# Patient Record
Sex: FEMALE | Race: WHITE | Employment: FULL TIME | URBAN - METROPOLITAN AREA
[De-identification: names, ages, dates, MRNs, and addresses within clinical notes are randomized per-mention and may not be internally consistent; named-entity substitution may affect disease eponyms.]

---

## 2017-06-07 ENCOUNTER — HOSPITAL ENCOUNTER (OUTPATIENT)
Dept: MAMMOGRAPHY | Age: 57
Discharge: HOME | End: 2017-06-07
Attending: INTERNAL MEDICINE
Payer: COMMERCIAL

## 2017-06-07 DIAGNOSIS — Z12.31 VISIT FOR SCREENING MAMMOGRAM: ICD-10-CM

## 2017-06-07 PROCEDURE — 77067 SCR MAMMO BI INCL CAD: CPT

## 2017-06-19 ENCOUNTER — OFFICE VISIT (OUTPATIENT)
Dept: INTERNAL MEDICINE CLINIC | Age: 57
End: 2017-06-19

## 2017-06-19 VITALS
OXYGEN SATURATION: 99 % | HEIGHT: 66 IN | RESPIRATION RATE: 16 BRPM | DIASTOLIC BLOOD PRESSURE: 61 MMHG | WEIGHT: 159 LBS | HEART RATE: 72 BPM | SYSTOLIC BLOOD PRESSURE: 93 MMHG | TEMPERATURE: 97.9 F | BODY MASS INDEX: 25.55 KG/M2

## 2017-06-19 DIAGNOSIS — H57.11 EYE DISCOMFORT, RIGHT: ICD-10-CM

## 2017-06-19 DIAGNOSIS — H43.391 FLOATERS, RIGHT: Primary | ICD-10-CM

## 2017-06-19 NOTE — PROGRESS NOTES
Robinson Torrez is a 56 y.o. female who presents today for Eye Problem (Pt seeing floaters in eye x 1 week. Right eye, comes and goes)  . She has a history of   Patient Active Problem List   Diagnosis Code    DUB (dysfunctional uterine bleeding) N93.8   . Today patient is here for an acute visit. she does not have other concerns. Problem visit:  Robinson Torrez is here for complaint of tearing and floaters of R eye. .  Problem began 1 week(s) ago. Severity is mild  Character of problem: stable and slightly improving. No pus or discharge. Mild injection of conjunctiva. No pain, mild discomfort. No changes in vision. Does not wear contacts. Associated symptoms include: none. Treatments tried include: medication not used      ROS  Review of Systems   Constitutional: Negative for chills, fever and weight loss. HENT: Negative for congestion, ear pain, hearing loss, sore throat and tinnitus. Eyes: Positive for blurred vision and discharge. Negative for double vision and photophobia. Eye pain: Mild discomfort. Respiratory: Negative for cough, hemoptysis, sputum production and shortness of breath. Cardiovascular: Negative for chest pain, palpitations, orthopnea and leg swelling. Gastrointestinal: Negative for abdominal pain, constipation, diarrhea, heartburn, nausea and vomiting. Genitourinary: Negative for dysuria, frequency and urgency. Musculoskeletal: Negative for joint pain and myalgias. Skin: Negative for itching and rash. Neurological: Negative. Negative for headaches. Endo/Heme/Allergies: Does not bruise/bleed easily. Psychiatric/Behavioral: Negative for depression, memory loss, substance abuse and suicidal ideas.        Visit Vitals    BP 93/61 (BP 1 Location: Left arm, BP Patient Position: Sitting)    Pulse 72    Temp 97.9 °F (36.6 °C) (Oral)    Resp 16    Ht 5' 6\" (1.676 m)    Wt 159 lb (72.1 kg)    SpO2 99%    BMI 25.66 kg/m2       Physical Exam Constitutional: She appears well-developed and well-nourished. HENT:   Head: Normocephalic and atraumatic. Right Ear: External ear normal.   Left Ear: External ear normal.   Eyes: Conjunctivae and EOM are normal. Pupils are equal, round, and reactive to light. Right eye exhibits no discharge. No scleral icterus. Neck: Normal range of motion. Neck supple. Pulmonary/Chest: Effort normal. No respiratory distress. Neurological: She is alert. Skin: Skin is warm and dry. Psychiatric: She has a normal mood and affect. Her behavior is normal.         Current Outpatient Prescriptions   Medication Sig    ibuprofen (MOTRIN) 600 mg tablet Take 1 Tab by mouth every eight (8) hours as needed for Pain.  MULTIVITAMIN (VITAMIN A DAY PO) Take  by mouth.  omega-3 fatty acids-vitamin e (FISH OIL) 1,000 mg cap Take 2 Caps by mouth.  Cetirizine (ZYRTEC) 10 mg cap Take 1 Tab by mouth daily.  CYANOCOBALAMIN, VITAMIN B-12, (VITAMIN B-12 PO) Take  by mouth.  benzocaine-menthol (CEPACOL SORE THROAT, CHINEDU-MEN,) 15-2.6 mg lozg lozenge Take 1 Lozenge by mouth every two (2) hours as needed for Sore throat. No current facility-administered medications for this visit.          Past Medical History:   Diagnosis Date    Asthma     years ago had excersice induced asthma none currently      Past Surgical History:   Procedure Laterality Date    HX BREAST BIOPSY      needle bx done in the 80's brenign not sure which breast    HX GYN  2/13    ovarian cyst removed and uterine ablation      Social History   Substance Use Topics    Smoking status: Never Smoker    Smokeless tobacco: Never Used    Alcohol use Yes      Comment: ocassional      Family History   Problem Relation Age of Onset    Diabetes Mother      type ii    COPD Mother    [facility] Arthritis-osteo Mother     Hypertension Mother     Heart Disease Father     Stroke Father     Cancer Father      kidney ca     Bipolar Disorder Father         No Known Allergies     Assessment/Plan  George LUX was seen today for eye problem. Diagnoses and all orders for this visit:    Floaters, right - concern over potential retinal issue. Pt to be seen by optho. If unable to get in soon will let us know .    -     REFERRAL TO OPHTHALMOLOGY    Eye discomfort, right  -     REFERRAL TO OPHTHALMOLOGY        Follow-up Disposition: Not on File    Pallavi Granado MD  6/19/2017

## 2017-09-13 ENCOUNTER — OFFICE VISIT (OUTPATIENT)
Dept: INTERNAL MEDICINE CLINIC | Age: 57
End: 2017-09-13

## 2017-09-13 VITALS
HEART RATE: 75 BPM | TEMPERATURE: 99.1 F | HEIGHT: 66 IN | BODY MASS INDEX: 25.39 KG/M2 | WEIGHT: 158 LBS | OXYGEN SATURATION: 97 % | SYSTOLIC BLOOD PRESSURE: 96 MMHG | RESPIRATION RATE: 18 BRPM | DIASTOLIC BLOOD PRESSURE: 65 MMHG

## 2017-09-13 DIAGNOSIS — Z00.00 WELL WOMAN EXAM (NO GYNECOLOGICAL EXAM): Primary | ICD-10-CM

## 2017-09-13 DIAGNOSIS — M54.31 RIGHT SIDED SCIATICA: ICD-10-CM

## 2017-09-13 NOTE — MR AVS SNAPSHOT
Visit Information Date & Time Provider Department Dept. Phone Encounter #  
 9/13/2017  1:30 PM Imelda Donald MD [facility] [address] [id number] Upcoming Health Maintenance Date Due Hepatitis C Screening 1960 INFLUENZA AGE 11 TO ADULT 8/1/2017 BREAST CANCER SCRN MAMMOGRAM 6/7/2019 PAP AKA CERVICAL CYTOLOGY 4/1/2020 COLONOSCOPY 5/29/2025 DTaP/Tdap/Td series (2 - Td) 1/20/2026 Allergies as of 9/13/2017  Review Complete On: 9/13/2017 By: Imelda Donald MD  
 No Known Allergies Current Immunizations  Reviewed on 9/13/2017 Name Date Tdap 1/20/2016 Reviewed by Imelda Donald MD on 9/13/2017 at  1:54 PM  
You Were Diagnosed With   
  
 Codes Comments Well woman exam (no gynecological exam)    -  Primary ICD-10-CM: Z00.00 ICD-9-CM: V70.0 Right sided sciatica     ICD-10-CM: M54.31 
ICD-9-CM: 724.3 Vitals BP Pulse Temp Resp Height(growth percentile) Weight(growth percentile) 96/65 (BP 1 Location: Left arm, BP Patient Position: Sitting) 75 99.1 °F (37.3 °C) (Oral) 18 5' 6\" (1.676 m) 158 lb (71.7 kg) SpO2 BMI OB Status Smoking Status 97% 25.5 kg/m2 Menopause Never Smoker Vitals History BMI and BSA Data Body Mass Index Body Surface Area 25.5 kg/m 2 1.83 m 2 Preferred Pharmacy Pharmacy Name Phone [id number] Almendarez [facility] [phone] Your Updated Medication List  
  
   
This list is accurate as of: 9/13/17  2:09 PM.  Always use your most recent med list.  
  
  
  
  
 benzocaine-menthol 15-2.6 mg Lozg lozenge Commonly known as:  CEPACOL SORE THROAT (CHINEDU-MEN) Take 1 Lozenge by mouth every two (2) hours as needed for Sore throat. FISH OIL 1,000 mg Cap Generic drug:  omega-3 fatty acids-vitamin e Take 2 Caps by mouth. ibuprofen 600 mg tablet Commonly known as:  MOTRIN Take 1 Tab by mouth every eight (8) hours as needed for Pain. VITAMIN A DAY PO Take  by mouth. VITAMIN B-12 PO Take  by mouth. ZyrTEC 10 mg Cap Generic drug:  Cetirizine Take 1 Tab by mouth daily. We Performed the Following CBC WITH AUTOMATED DIFF [19841 CPT(R)] HEPATITIS C AB [28179 CPT(R)] LIPID PANEL [68599 CPT(R)] METABOLIC PANEL, COMPREHENSIVE [91039 CPT(R)] T4, FREE [id number] CPT(R)] TSH 3RD GENERATION [96368 CPT(R)] URINALYSIS W/ RFLX MICROSCOPIC [14034 CPT(R)] VITAMIN D, 25 HYDROXY [id number] CPT(R)] Introducing [facility]! Dear Tamera LUX: 
Thank you for requesting a [employer] account. Our records indicate that you already have an active [employer] account. You can access your account anytime at [email or web address] Did you know that you can access your hospital and ER discharge instructions at any time in [employer]? You can also review all of your test results from your hospital stay or ER visit. Additional Information If you have questions, please visit the Frequently Asked Questions section of the [employer] website at [email or web address] Remember, [employer] is NOT to be used for urgent needs. For medical emergencies, dial 911. Now available from your iPhone and Android! Please provide this summary of care documentation to your next provider. Your primary care clinician is listed as Roxanne Nair. If you have any questions after today's visit, please call [phone].

## 2017-09-13 NOTE — PROGRESS NOTES
Subjective:   56 y.o. female for Well Woman Check. Her gyne and breast care is done elsewhere by her Ob-Gyne physician. dr Mariam Barksdale  Did have eye exam for floaters and all normal  Had pap and vaginal us this spring normal  dexa normal  colonosocpy 5/15  [place] [facility] [place] and son in [facility] now and working  She enjoys cycle class biweekly and massage and has some right sided sciatica better with massage    Patient Active Problem List    Diagnosis Date Noted    DUB (dysfunctional uterine bleeding) 02/21/2013     Current Outpatient Prescriptions   Medication Sig Dispense Refill    ibuprofen (MOTRIN) 600 mg tablet Take 1 Tab by mouth every eight (8) hours as needed for Pain. 36 Tab 2    MULTIVITAMIN (VITAMIN A DAY PO) Take  by mouth.  omega-3 fatty acids-vitamin e (FISH OIL) 1,000 mg cap Take 2 Caps by mouth.  Cetirizine (ZYRTEC) 10 mg cap Take 1 Tab by mouth daily.  CYANOCOBALAMIN, VITAMIN B-12, (VITAMIN B-12 PO) Take  by mouth.  benzocaine-menthol (CEPACOL SORE THROAT, CHINEDU-MEN,) 15-2.6 mg lozg lozenge Take 1 Lozenge by mouth every two (2) hours as needed for Sore throat. 27 Lozenge 0     No Known Allergies  Past Medical History:   Diagnosis Date    Asthma     years ago had excersice induced asthma none currently     Past Surgical History:   Procedure Laterality Date    HX BREAST BIOPSY      needle bx done in the 80's brenign not sure which breast    HX GYN  2/13    ovarian cyst removed and uterine ablation     Family History   Problem Relation Age of Onset    Diabetes Mother      type ii    COPD Mother    [facility] Arthritis-osteo Mother     Hypertension Mother     Heart Disease Father     Stroke Father     Cancer Father      kidney ca     Bipolar Disorder Father      Social History   Substance Use Topics    Smoking status: Never Smoker    Smokeless tobacco: Never Used    Alcohol use Yes      Comment: ocassional             ROS: Feeling generally well.  No TIA's or unusual headaches, no dysphagia. No prolonged cough. No dyspnea or chest pain on exertion. No abdominal pain, change in bowel habits, black or bloody stools. No urinary tract symptoms. No new or unusual musculoskeletal symptoms. Specific concerns today: right buttock pain occas radiation to right leg not weak or numb. Objective: The patient appears well, alert, oriented x 3, in no distress. Visit Vitals    BP 96/65 (BP 1 Location: Left arm, BP Patient Position: Sitting)    Pulse 75    Temp 99.1 °F (37.3 °C) (Oral)    Resp 18    Ht 5' 6\" (1.676 m)    Wt 158 lb (71.7 kg)    SpO2 97%    BMI 25.5 kg/m2     ENT normal.  Neck supple. No adenopathy or thyromegaly. DENI. Lungs are clear, good air entry, no wheezes, rhonchi or rales. S1 and S2 normal, no murmurs, regular rate and rhythm. Abdomen soft without tenderness, guarding, mass or organomegaly. Extremities show no edema, normal peripheral pulses. Neurological is normal, no focal findings. Breast and Pelvic exams are deferred. dtr knees 2 plus and no weakness in legs    Assessment/Plan:   Well Woman  continue present plan  Diagnoses and all orders for this visit:    1. Well woman exam (no gynecological exam)  -     CBC WITH AUTOMATED DIFF  -     URINALYSIS W/ RFLX MICROSCOPIC  -     METABOLIC PANEL, COMPREHENSIVE  -     LIPID PANEL  -     TSH 3RD GENERATION  -     T4, FREE  -     VITAMIN D, 25 HYDROXY  -     HEPATITIS C AB    2.  Right sided sciatica    Cont stretches and massage

## 2017-09-16 LAB
25(OH)D3+25(OH)D2 SERPL-MCNC: 34.8 NG/ML (ref 30–100)
ALBUMIN SERPL-MCNC: 4 G/DL (ref 3.5–5.5)
ALBUMIN/GLOB SERPL: 1.5 {RATIO} (ref 1.2–2.2)
ALP SERPL-CCNC: 67 IU/L (ref 39–117)
ALT SERPL-CCNC: 10 IU/L (ref 0–32)
APPEARANCE UR: CLEAR
AST SERPL-CCNC: 16 IU/L (ref 0–40)
BASOPHILS # BLD AUTO: 0 X10E3/UL (ref 0–0.2)
BASOPHILS NFR BLD AUTO: 1 %
BILIRUB SERPL-MCNC: 0.8 MG/DL (ref 0–1.2)
BILIRUB UR QL STRIP: NEGATIVE
BUN SERPL-MCNC: 19 MG/DL (ref 6–24)
BUN/CREAT SERPL: 20 (ref 9–23)
CALCIUM SERPL-MCNC: 9.1 MG/DL (ref 8.7–10.2)
CHLORIDE SERPL-SCNC: 100 MMOL/L (ref 96–106)
CHOLEST SERPL-MCNC: 192 MG/DL (ref 100–199)
CO2 SERPL-SCNC: 25 MMOL/L (ref 18–29)
COLOR UR: YELLOW
CREAT SERPL-MCNC: 0.96 MG/DL (ref 0.57–1)
EOSINOPHIL # BLD AUTO: 0.2 X10E3/UL (ref 0–0.4)
EOSINOPHIL NFR BLD AUTO: 3 %
ERYTHROCYTE [DISTWIDTH] IN BLOOD BY AUTOMATED COUNT: 13 % (ref 12.3–15.4)
GLOBULIN SER CALC-MCNC: 2.6 G/DL (ref 1.5–4.5)
GLUCOSE SERPL-MCNC: 84 MG/DL (ref 65–99)
GLUCOSE UR QL: NEGATIVE
HCT VFR BLD AUTO: 41.6 % (ref 34–46.6)
HCV AB S/CO SERPL IA: <0.1 S/CO RATIO (ref 0–0.9)
HDLC SERPL-MCNC: 51 MG/DL
HGB BLD-MCNC: 14.2 G/DL (ref 11.1–15.9)
HGB UR QL STRIP: NEGATIVE
IMM GRANULOCYTES # BLD: 0 X10E3/UL (ref 0–0.1)
IMM GRANULOCYTES NFR BLD: 0 %
INTERPRETATION, 910389: NORMAL
KETONES UR QL STRIP: NEGATIVE
LDLC SERPL CALC-MCNC: 118 MG/DL (ref 0–99)
LEUKOCYTE ESTERASE UR QL STRIP: NEGATIVE
LYMPHOCYTES # BLD AUTO: 1.8 X10E3/UL (ref 0.7–3.1)
LYMPHOCYTES NFR BLD AUTO: 32 %
MCH RBC QN AUTO: 29.8 PG (ref 26.6–33)
MCHC RBC AUTO-ENTMCNC: 34.1 G/DL (ref 31.5–35.7)
MCV RBC AUTO: 87 FL (ref 79–97)
MICRO URNS: NORMAL
MONOCYTES # BLD AUTO: 0.3 X10E3/UL (ref 0.1–0.9)
MONOCYTES NFR BLD AUTO: 6 %
NEUTROPHILS # BLD AUTO: 3.2 X10E3/UL (ref 1.4–7)
NEUTROPHILS NFR BLD AUTO: 58 %
NITRITE UR QL STRIP: NEGATIVE
PH UR STRIP: 7 [PH] (ref 5–7.5)
PLATELET # BLD AUTO: 251 X10E3/UL (ref 150–379)
POTASSIUM SERPL-SCNC: 4.1 MMOL/L (ref 3.5–5.2)
PROT SERPL-MCNC: 6.6 G/DL (ref 6–8.5)
PROT UR QL STRIP: NEGATIVE
RBC # BLD AUTO: 4.77 X10E6/UL (ref 3.77–5.28)
SODIUM SERPL-SCNC: 140 MMOL/L (ref 134–144)
SP GR UR: 1.02 (ref 1–1.03)
T4 FREE SERPL-MCNC: 1.06 NG/DL (ref 0.82–1.77)
TRIGL SERPL-MCNC: 113 MG/DL (ref 0–149)
TSH SERPL DL<=0.005 MIU/L-ACNC: 5.06 UIU/ML (ref 0.45–4.5)
UROBILINOGEN UR STRIP-MCNC: 0.2 MG/DL (ref 0.2–1)
VLDLC SERPL CALC-MCNC: 23 MG/DL (ref 5–40)
WBC # BLD AUTO: 5.5 X10E3/UL (ref 3.4–10.8)

## 2017-10-11 ENCOUNTER — PATIENT MESSAGE (OUTPATIENT)
Dept: INTERNAL MEDICINE CLINIC | Age: 57
End: 2017-10-11

## 2017-10-11 DIAGNOSIS — E03.9 ACQUIRED HYPOTHYROIDISM: Primary | ICD-10-CM

## 2017-10-11 NOTE — TELEPHONE ENCOUNTER
From: Billie Schilder  To: Lizandro Singer MD  Sent: 10/11/2017 12:30 PM EDT  Subject: Visit Follow-Up Question    I just read the results of my lab work that reflected possible hypothyroidism, which would explain my fatigue. I would like to reschedule a follow-up for new lab work on the [facility] with an appointment with Dr. Jonn Burns. I will need to do the blood work first after 11/15/17. Can she place the orders so that I can go before my appointment t with her? Can I have an appointment on 11/20 after 11:00 am? Thank you.

## 2017-11-07 ENCOUNTER — PATIENT MESSAGE (OUTPATIENT)
Dept: INTERNAL MEDICINE CLINIC | Age: 57
End: 2017-11-07

## 2017-11-08 NOTE — TELEPHONE ENCOUNTER
From: Mónica Blank  To: Leelee Narayan MD  Sent: 11/7/2017 8:18 AM EST  Subject: Non-Urgent Medical Question    Thank you for sending the lab request form that I received a few weeks ago. When should I get my TSH rechecked, so that Davion James can have my results by the 20th? I realize that I needed to wait a certain amount of time to have the blood drawn again. Thank you for the clarification.

## 2017-11-14 LAB
FT4I SERPL CALC-MCNC: 1.6 (ref 1.2–4.9)
T3RU NFR SERPL: 28 % (ref 24–39)
T4 SERPL-MCNC: 5.8 UG/DL (ref 4.5–12)
TSH SERPL DL<=0.005 MIU/L-ACNC: 2.3 UIU/ML (ref 0.45–4.5)

## 2017-11-20 ENCOUNTER — OFFICE VISIT (OUTPATIENT)
Dept: INTERNAL MEDICINE CLINIC | Age: 57
End: 2017-11-20

## 2017-11-20 VITALS
DIASTOLIC BLOOD PRESSURE: 78 MMHG | HEART RATE: 71 BPM | HEIGHT: 66 IN | SYSTOLIC BLOOD PRESSURE: 110 MMHG | RESPIRATION RATE: 16 BRPM | WEIGHT: 159 LBS | TEMPERATURE: 98.5 F | BODY MASS INDEX: 25.55 KG/M2 | OXYGEN SATURATION: 98 %

## 2017-11-20 DIAGNOSIS — R79.89 ABNORMAL TSH: Primary | ICD-10-CM

## 2017-11-20 DIAGNOSIS — M54.31 RIGHT SIDED SCIATICA: ICD-10-CM

## 2017-11-20 NOTE — PROGRESS NOTES
HISTORY OF PRESENT ILLNESS  Rama Pichardo is a 56 y.o. female. HPI  Follow up. Issues:  1. Thyroid. She had a TSH of 5. We repeated it and it was down to 2.3 with normal panel. Interestingly she'd had some fatigue symptoms when the TSH was elevated and these have resolved. She currently has really no symptoms of fatigue, skin, hair or bowel changes. We're going to continue to monitor. 2. Sciatica, right leg, intermittent, better when she is taking cycling classes. Tends to worry her more at night when trying to go to bed. No weakness in foot. Review of Systems   Constitutional: Negative for chills, fever, malaise/fatigue and weight loss. Respiratory: Negative for cough, shortness of breath and wheezing. Cardiovascular: Negative for chest pain, palpitations, orthopnea, leg swelling and PND. Gastrointestinal: Negative for constipation, diarrhea, heartburn and nausea. Musculoskeletal: Positive for back pain. Negative for myalgias. Neurological: Negative for dizziness, focal weakness and headaches. Physical Exam   Constitutional: She is oriented to person, place, and time. She appears well-developed and well-nourished. HENT:   Head: Normocephalic and atraumatic. Neck: Normal range of motion. Neck supple. Carotid bruit is not present. No thyromegaly present. Cardiovascular: Normal rate, regular rhythm, S1 normal, S2 normal, normal heart sounds and intact distal pulses. No murmur heard. Pulmonary/Chest: Effort normal and breath sounds normal. No respiratory distress. She has no wheezes. She has no rales. Musculoskeletal: She exhibits no edema. patella reflex bilat intact and slr is negative   Neurological: She is alert and oriented to person, place, and time. Psychiatric: She has a normal mood and affect. Her behavior is normal.   Nursing note and vitals reviewed. ASSESSMENT and PLAN  Diagnoses and all orders for this visit:    1.  Abnormal TSH-now normal and no sxs  follow    2.  Right sided sciatica  Exercises reviewed and avoid twisting and lifting

## 2018-08-31 ENCOUNTER — HOSPITAL ENCOUNTER (OUTPATIENT)
Dept: MAMMOGRAPHY | Age: 58
Discharge: HOME | End: 2018-08-31
Attending: INTERNAL MEDICINE
Payer: COMMERCIAL

## 2018-08-31 DIAGNOSIS — Z12.31 VISIT FOR SCREENING MAMMOGRAM: ICD-10-CM

## 2018-08-31 PROCEDURE — 77063 BREAST TOMOSYNTHESIS BI: CPT

## 2019-03-01 ENCOUNTER — HOSPITAL ENCOUNTER (OUTPATIENT)
Dept: MAMMOGRAPHY | Age: 59
Discharge: HOME | End: 2019-03-01
Attending: OBSTETRICS & GYNECOLOGY
Payer: COMMERCIAL

## 2019-03-01 DIAGNOSIS — N63.20 LEFT BREAST MASS: ICD-10-CM

## 2019-03-01 DIAGNOSIS — R92.8 ABNORMAL MAMMOGRAM: ICD-10-CM

## 2019-03-01 PROCEDURE — 77065 DX MAMMO INCL CAD UNI: CPT

## 2019-03-01 PROCEDURE — 76642 ULTRASOUND BREAST LIMITED: CPT

## 2019-05-03 ENCOUNTER — OFFICE VISIT (OUTPATIENT)
Dept: INTERNAL MEDICINE CLINIC | Age: 59
End: 2019-05-03

## 2019-05-03 VITALS
OXYGEN SATURATION: 95 % | TEMPERATURE: 98.2 F | HEART RATE: 66 BPM | BODY MASS INDEX: 25.23 KG/M2 | SYSTOLIC BLOOD PRESSURE: 107 MMHG | WEIGHT: 157 LBS | RESPIRATION RATE: 16 BRPM | HEIGHT: 66 IN | DIASTOLIC BLOOD PRESSURE: 71 MMHG

## 2019-05-03 DIAGNOSIS — J30.1 SEASONAL ALLERGIC RHINITIS DUE TO POLLEN: Primary | ICD-10-CM

## 2019-05-03 DIAGNOSIS — J45.21 MILD INTERMITTENT REACTIVE AIRWAY DISEASE WITH ACUTE EXACERBATION: ICD-10-CM

## 2019-05-03 RX ORDER — MONTELUKAST SODIUM 10 MG/1
10 TABLET ORAL DAILY
Qty: 30 TAB | Refills: 2 | Status: SHIPPED | OUTPATIENT
Start: 2019-05-03 | End: 2019-10-10

## 2019-05-03 RX ORDER — ALBUTEROL SULFATE 90 UG/1
2 AEROSOL, METERED RESPIRATORY (INHALATION)
Qty: 1 INHALER | Refills: 2 | Status: SHIPPED | OUTPATIENT
Start: 2019-05-03 | End: 2021-04-28 | Stop reason: ALTCHOICE

## 2019-05-03 NOTE — PROGRESS NOTES
HISTORY OF PRESENT ILLNESS Tra Crowe is a 64 y.o. female. HPI Presents with complaints of significantly worse seasonal allergies this spring then she has had in the past; having nasal congestion, postnasal drainage, sneezing but has also developed some upper airway restriction and has been noticing some wheezing over the past couple of days. Has been taking OTC Zyrtec and Flonase nasal spray on a regular basis for environmental allergies but having more breakthrough symptoms with onset of pollen season. Has been tested for allergies in the past and she had a very significant allergy to mold but also grass. Noted some upper chest tightness and wheezing 2 days ago while going for an extended walk and this concerned her. Has history of exercise-induced asthma in the past and had an old inhaler at home which she used without relief. Denies fever, chills, purulent mucus. She is flying to [place] this afternoon to visit her daughter and will be there for a week. Her son takes Singulair with good results. Patient Active Problem List  
Diagnosis Code  DUB (dysfunctional uterine bleeding) N93.8 Past Surgical History:  
Procedure Laterality Date  HX GYN  2/13 ovarian cyst removed and uterine ablation  ANN BIOPSY BREAST STEREOTACTIC Left ? Long ago Benign Social History Socioeconomic History  Marital status:  Spouse name: Not on file  Number of children: Not on file  Years of education: Not on file  Highest education level: Not on file Occupational History  Not on file Social Needs  Financial resource strain: Not on file  Food insecurity:  
  Worry: Not on file Inability: Not on file  Transportation needs:  
  Medical: Not on file Non-medical: Not on file Tobacco Use  Smoking status: Never Smoker  Smokeless tobacco: Never Used Substance and Sexual Activity  Alcohol use: Yes Comment: ocassional  
 Drug use: Never  Sexual activity: Yes  
  Partners: Male Lifestyle  Physical activity:  
  Days per week: Not on file Minutes per session: Not on file  Stress: Not on file Relationships  Social connections:  
  Talks on phone: Not on file Gets together: Not on file Attends Restorationist service: Not on file Active member of club or organization: Not on file Attends meetings of clubs or organizations: Not on file Relationship status: Not on file  Intimate partner violence:  
  Fear of current or ex partner: Not on file Emotionally abused: Not on file Physically abused: Not on file Forced sexual activity: Not on file Other Topics Concern  Not on file Social History Narrative  Not on file Family History Problem Relation Age of Onset  Diabetes Mother   
     type ii  COPD Mother [employer] Arthritis-osteo Mother  Hypertension Mother  Heart Disease Father  Stroke Father  Cancer Father   
     kidney ca  Bipolar Disorder Father Current Outpatient Medications Medication Sig  MAGNESIUM PO Take 1 Tab by mouth nightly.  montelukast (SINGULAIR) 10 mg tablet Take 1 Tab by mouth daily.  albuterol (PROVENTIL HFA, VENTOLIN HFA, PROAIR HFA) 90 mcg/actuation inhaler Take 2 Puffs by inhalation every six (6) hours as needed for Wheezing.  ibuprofen (MOTRIN) 600 mg tablet Take 1 Tab by mouth every eight (8) hours as needed for Pain.  MULTIVITAMIN (VITAMIN A DAY PO) Take  by mouth.  omega-3 fatty acids-vitamin e (FISH OIL) 1,000 mg cap Take 2 Caps by mouth.  Cetirizine (ZYRTEC) 10 mg cap Take 1 Tab by mouth daily.  CYANOCOBALAMIN, VITAMIN B-12, (VITAMIN B-12 PO) Take  by mouth.  benzocaine-menthol (CEPACOL SORE THROAT, CHINEDU-MEN,) 15-2.6 mg lozg lozenge Take 1 Lozenge by mouth every two (2) hours as needed for Sore throat. No current facility-administered medications for this visit. No Known Allergies Immunization History Administered Date(s) Administered  Influenza Vaccine 09/13/2017  Tdap 01/20/2016 Review of Systems Constitutional: Positive for malaise/fatigue. Negative for chills and fever. HENT: Positive for congestion. Negative for sinus pain and sore throat. Respiratory: Positive for cough, shortness of breath and wheezing. Negative for sputum production. Cardiovascular: Negative for chest pain and palpitations. Gastrointestinal: Negative for nausea and vomiting. Musculoskeletal: Negative for joint pain and myalgias. Neurological: Positive for headaches. Negative for tingling. Endo/Heme/Allergies: Positive for environmental allergies. /71 (BP 1 Location: Left arm, BP Patient Position: Sitting)   Pulse 66   Temp 98.2 °F (36.8 °C) (Oral)   Resp 16   Ht 5' 6\" (1.676 m)   Wt 157 lb (71.2 kg)   LMP 02/28/2017   SpO2 95%   BMI 25.34 kg/m² Physical Exam  
Constitutional: She is oriented to person, place, and time. She appears well-developed and well-nourished. HENT:  
Head: Normocephalic and atraumatic. Right Ear: External ear normal.  
Left Ear: External ear normal.  
Nose: Mucosal edema and rhinorrhea present. Right sinus exhibits no maxillary sinus tenderness and no frontal sinus tenderness. Left sinus exhibits no maxillary sinus tenderness and no frontal sinus tenderness. Mouth/Throat: No posterior oropharyngeal erythema. Boggy nasal turbinates Neck: Normal range of motion. Neck supple. No thyromegaly present. Cardiovascular: Normal rate and regular rhythm. Pulmonary/Chest: Effort normal. She has wheezes (faint scattered wheezing). She has no rales. Musculoskeletal: Normal range of motion. Lymphadenopathy:  
  She has no cervical adenopathy. Neurological: She is alert and oriented to person, place, and time. Psychiatric: She has a normal mood and affect. Her behavior is normal.  
Nursing note and vitals reviewed. ASSESSMENT and PLAN Diagnoses and all orders for this visit: 1. Seasonal allergic rhinitis due to pollen --continue with OTC Zyrtec and Flonase nasal spray. -     montelukast (SINGULAIR) 10 mg tablet; Take 1 Tab by mouth daily. 2. Mild intermittent reactive airway disease with acute exacerbation 
-     montelukast (SINGULAIR) 10 mg tablet; Take 1 Tab by mouth daily. -     albuterol (PROVENTIL HFA, VENTOLIN HFA, PROAIR HFA) 90 mcg/actuation inhaler; Take 2 Puffs by inhalation every six (6) hours as needed for Wheezing. Encouraged to return to office for CPE with fasting labs. lab results and schedule of future lab studies reviewed with patient 
reviewed diet, exercise and weight control 
reviewed medications and side effects in detail This note will not be viewable in [address]

## 2019-05-03 NOTE — PATIENT INSTRUCTIONS
Seasonal Allergies: Care Instructions Your Care Instructions Allergies occur when your body's defense system (immune system) overreacts to certain substances. The immune system treats a harmless substance as if it were a harmful germ or virus. Many things can cause this to happen. Examples include pollens, medicine, food, dust, animal dander, and mold. Your allergies are seasonal if you have symptoms just at certain times of the year. In that case, you are probably allergic to pollens from certain trees, grasses, or weeds. Allergies can be mild or severe. Over-the-counter allergy medicine may help with some symptoms. Read and follow all instructions on the label. Managing your allergies is an important part of staying healthy. Your doctor may suggest that you have tests to help find the cause of your allergies. When you know what things trigger your symptoms, you can avoid them. This can prevent allergy symptoms and other health problems. In some cases, immunotherapy might help. For this treatment, you get shots or use pills that have a small amount of certain allergens in them. Your body \"gets used to\" the allergen, so you react less to it over time. This kind of treatment may help prevent or reduce some allergy symptoms. Follow-up care is a key part of your treatment and safety. Be sure to make and go to all appointments, and call your doctor if you are having problems. It's also a good idea to know your test results and keep a list of the medicines you take. How can you care for yourself at home? · Be safe with medicines. Take your medicines exactly as prescribed. Call your doctor if you think you are having a problem with your medicine. · During your allergy season, keep windows closed. If you need to use air-conditioning, change or clean all filters every month. Take a shower and change your clothes after you have been outside. · Stay inside when pollen counts are high. Vacuum once or twice a week. Use a vacuum  with a HEPA filter or a double-thickness filter. When should you call for help? Give an epinephrine shot if: 
  · You think you are having a severe allergic reaction.  
 After giving an epinephrine shot, call 911, even if you feel better. 
 Call 911 if: 
  · You have symptoms of a severe allergic reaction. These may include: 
? Sudden raised, red areas (hives) all over your body. ? Swelling of the throat, mouth, lips, or tongue. ? Trouble breathing. ? Passing out (losing consciousness). Or you may feel very lightheaded or suddenly feel weak, confused, or restless.  
  · You have been given an epinephrine shot, even if you feel better.  
 Call your doctor now or seek immediate medical care if: 
  · You have symptoms of an allergic reaction, such as: ? A rash or hives (raised, red areas on the skin). ? Itching. ? Swelling. ? Belly pain, nausea, or vomiting.  
 Watch closely for changes in your health, and be sure to contact your doctor if: 
  · You do not get better as expected. Where can you learn more? Go to http://brent-goran.info/. Enter J912 in the search box to learn more about \"Seasonal Allergies: Care Instructions. \" Current as of: June 27, 2018 Content Version: 11.9 © 9310-3641 [employer]. Care instructions adapted under license by [employer] (which disclaims liability or warranty for this information). If you have questions about a medical condition or this instruction, always ask your healthcare professional. [place] [address] any warranty or liability for your use of this information. Reactive Airway Disease: Care Instructions Your Care Instructions Reactive airway disease is a breathing problem that appears as wheezing, a whistling noise in your airways.  It may be caused by a viral or bacterial infection, allergies, tobacco smoke, or something else in the environment. When you are around these triggers, your body releases chemicals that make the airways get tight. Reactive airway disease is a lot like asthma. Both can cause wheezing. But asthma is ongoing, while reactive airway disease may occur only now and then. Tests can be done to tell whether you have asthma. You may take the same medicines used to treat asthma. Good home care and follow-up care with your doctor can help you recover. Follow-up care is a key part of your treatment and safety. Be sure to make and go to all appointments, and call your doctor if you are having problems. It's also a good idea to know your test results and keep a list of the medicines you take. How can you care for yourself at home? · Take your medicines exactly as prescribed. Call your doctor if you think you are having a problem with your medicine. · Do not smoke or allow others to smoke around you. If you need help quitting, talk to your doctor about stop-smoking programs and medicines. These can increase your chances of quitting for good. · If you know what caused your wheezing (such as perfume or the odor of household chemicals), try to avoid it in the future. · Wash your hands several times a day, and consider using hand gels or wipes that contain alcohol. This can prevent colds and other infections. When should you call for help? Call 911 anytime you think you may need emergency care. For example, call if: 
  · You have severe trouble breathing.  
 Watch closely for changes in your health, and be sure to contact your doctor if: 
  · You cough up yellow, dark brown, or bloody mucus.  
  · You have a fever.  
  · Your wheezing gets worse. Where can you learn more? Go to http://brent-goran.info/. Enter Z711 in the search box to learn more about \"Reactive Airway Disease: Care Instructions. \" Current as of: September 5, 2018 Content Version: 11.9 © 4599-4770 [employer]. Care instructions adapted under license by [employer] (which disclaims liability or warranty for this information). If you have questions about a medical condition or this instruction, always ask your healthcare professional. [address] any warranty or liability for your use of this information.

## 2019-05-03 NOTE — PROGRESS NOTES
Identified pt with two pt identifiers(name and ). Reviewed record in preparation for visit and have obtained necessary documentation. Chief Complaint Patient presents with  Breathing Problem Pt states been having difficulty breathing off and on throughout the day since 19 Visit Vitals /71 (BP 1 Location: Left arm, BP Patient Position: Sitting) Pulse 66 Temp 98.2 °F (36.8 °C) (Oral) Resp 16 Ht 5' 6\" (1.676 m) Wt 157 lb (71.2 kg) SpO2 95% BMI 25.34 kg/m² Health Maintenance Due Topic  Shingrix Vaccine Age 50> (1 of 2) Coordination of Care Questionnaire: 
:  
1) Have you been to an emergency room, urgent care, or hospitalized since your last visit? If yes, where when, and reason for visit? No 
  
 
 
2. Have seen or consulted any other health care provider since your last visit? If yes, where when, and reason for visit? Yes, Dr. Courtney Almaraz at [facility] Patient is accompanied by self I have received verbal consent from Stephenie Hartman to discuss any/all medical information while they are present in the room.

## 2019-05-08 NOTE — MR AVS SNAPSHOT
Patient was concerned that she is not taking medicine for Osteoporosis, Ml mentioned when they came out for a visit that she was not taking anything. The patient said Dr. Hal Guzman had prescribed medication but it made her sick but she is currently taking vitamins and calcium pills. Her number is [phone].       tio Visit Information Date & Time Provider Department Dept. Phone Encounter #  
 6/19/2017  8:15 AM Imelda Wahl MD [facility] [address] [facility] [id number] Your Appointments 9/13/2017  1:30 PM  
COMPLETE PHYSICAL with Seth Mccray MD  
[facility] [address]) Appt Note: annual physical with fasting blood work; pt r/s  
 [address] [facility] [address]  
[phone]  
  
   
 [address] [facility] [address] Upcoming Health Maintenance Date Due Hepatitis C Screening 1960 PAP AKA CERVICAL CYTOLOGY 4/14/1983 FOBT Q 1 YEAR AGE 50-75 4/14/2012 INFLUENZA AGE 9 TO ADULT 8/1/2017 BREAST CANCER SCRN MAMMOGRAM 6/7/2019 DTaP/Tdap/Td series (2 - Td) 1/20/2026 Allergies as of 6/19/2017  Review Complete On: 1/33/9041 By: Lydia Oliveros Wears No Known Allergies Current Immunizations  Never Reviewed Name Date Tdap 1/20/2016 Not reviewed this visit You Were Diagnosed With   
  
 Codes Comments Floaters, right    -  Primary ICD-10-CM: H43.391 ICD-9-CM: 379.24 Eye discomfort, right     ICD-10-CM: H57.11 ICD-9-CM: 379.91 Vitals BP Pulse Temp Resp Height(growth percentile) Weight(growth percentile) 93/61 (BP 1 Location: Left arm, BP Patient Position: Sitting) 72 97.9 °F (36.6 °C) (Oral) 16 5' 6\" (1.676 m) 159 lb (72.1 kg) LMP SpO2 BMI OB Status Smoking Status 02/28/2017 99% 25.66 kg/m2 Having regular periods Never Smoker Vitals History BMI and BSA Data Body Mass Index Body Surface Area  
 25.66 kg/m 2 1.83 m 2 Preferred Pharmacy Pharmacy Name Phone [facility] [address] Your Updated Medication List  
  
   
This list is accurate as of: 6/19/17  8:51 AM.  Always use your most recent med list.  
  
  
  
  
 benzocaine-menthol 15-2.6 mg Lozg lozenge Commonly known as:  CEPACOL SORE THROAT (CHINEDU-MEN) Take 1 Lozenge by mouth every two (2) hours as needed for Sore throat. FISH OIL 1,000 mg Cap Generic drug:  omega-3 fatty acids-vitamin e Take 2 Caps by mouth. ibuprofen 600 mg tablet Commonly known as:  MOTRIN Take 1 Tab by mouth every eight (8) hours as needed for Pain. VITAMIN A DAY PO Take  by mouth. VITAMIN B-12 PO Take  by mouth. ZyrTEC 10 mg Cap Generic drug:  Cetirizine Take 1 Tab by mouth daily. We Performed the Following REFERRAL TO OPHTHALMOLOGY [REF57 Custom] Comments:  
 Please evaluate patient for R eye floaters, r/o retinal derangement. Referral Information Referral ID Referred By Referred To  
  
 [id number] July Zavala Not Available Visits Status Start Date End Date 1 New Request 6/19/17 6/19/18 If your referral has a status of pending review or denied, additional information will be sent to support the outcome of this decision. Introducing [facility]! Dear Sanju LUX: 
Thank you for requesting a VasSol account. Our records indicate that you already have an active VasSol account. You can access your account anytime at [email or web address] Did you know that you can access your hospital and ER discharge instructions at any time in VasSol? You can also review all of your test results from your hospital stay or ER visit. Additional Information If you have questions, please visit the Frequently Asked Questions section of the VasSol website at [email or web address] Remember, VasSol is NOT to be used for urgent needs. For medical emergencies, dial 911. Now available from your iPhone and Android! Please provide this summary of care documentation to your next provider. Your primary care clinician is listed as Roxanne Nair.  If you have any questions after today's visit, please call [phone].

## 2019-08-29 ENCOUNTER — OFFICE VISIT (OUTPATIENT)
Dept: INTERNAL MEDICINE CLINIC | Age: 59
End: 2019-08-29

## 2019-08-29 VITALS
BODY MASS INDEX: 24.91 KG/M2 | HEIGHT: 66 IN | TEMPERATURE: 97.7 F | HEART RATE: 70 BPM | RESPIRATION RATE: 16 BRPM | OXYGEN SATURATION: 97 % | WEIGHT: 155 LBS | SYSTOLIC BLOOD PRESSURE: 102 MMHG | DIASTOLIC BLOOD PRESSURE: 72 MMHG

## 2019-08-29 DIAGNOSIS — R11.0 NAUSEA: Primary | ICD-10-CM

## 2019-08-29 DIAGNOSIS — R53.83 OTHER FATIGUE: ICD-10-CM

## 2019-08-29 DIAGNOSIS — R19.7 DIARRHEA, UNSPECIFIED TYPE: ICD-10-CM

## 2019-08-29 NOTE — PROGRESS NOTES
HISTORY OF PRESENT ILLNESS  Lorena Morelos is a 64 y.o. female. HPI  Pt reports that while playing tennis last week she started experiencing \"fever like sweats\", dizziness when she bent over, and nausea. She notes experiencing these sx are triggered by work outs. She notes improvement when drinking an electrolyte drink. She recalls feeling \"off\" for the last few weeks. She notes occasional gastrointestinal distress (diarrhea and stomach pains). Pt reports that she has had a slight intermittent metallic taste in her mouth and slightly decreased appetite. Pt reports that she ate an extremely \"mushy\" cantaloupe. Denies insomnia, bloating, increased stress, change in supplements, sinusitis, increased flatulence, recent travels, back, abdominal or shoulder pain. Pt continues with Fish oil and vitamin B supplements in the morning. Review of Systems   Constitutional: Positive for malaise/fatigue. Nausea   Gastrointestinal: Positive for diarrhea. Metallic taste in mouth   Neurological: Positive for dizziness. All other systems reviewed and are negative. Physical Exam   Constitutional: She is oriented to person, place, and time. She appears well-developed and well-nourished. HENT:   Head: Normocephalic and atraumatic. Right Ear: External ear normal.   Left Ear: External ear normal.   Nose: Nose normal.   Mouth/Throat: Oropharynx is clear and moist.   Eyes: Pupils are equal, round, and reactive to light. Conjunctivae and EOM are normal.   Neck: Normal range of motion. Neck supple. Cardiovascular: Normal rate, regular rhythm, normal heart sounds and intact distal pulses. Pulmonary/Chest: Effort normal and breath sounds normal. Right breast exhibits no inverted nipple, no mass, no nipple discharge, no skin change and no tenderness. Left breast exhibits no inverted nipple, no mass, no nipple discharge, no skin change and no tenderness. No breast swelling, tenderness, discharge or bleeding. Breasts are symmetrical.   Abdominal: Soft. Bowel sounds are normal. There is no CVA tenderness. No pain upon palpation    Genitourinary: Rectum normal and vagina normal. Rectal exam shows anal tone normal and guaiac negative stool. No breast swelling, tenderness, discharge or bleeding. Musculoskeletal: Normal range of motion. Neurological: She is alert and oriented to person, place, and time. Skin: Skin is warm and dry. Psychiatric: She has a normal mood and affect. Her behavior is normal. Judgment and thought content normal.   Nursing note and vitals reviewed. ASSESSMENT and PLAN  Diagnoses and all orders for this visit:    1. Nausea  Ordered labs to r/o thyroid issues, anemia, etc. Ordered abdominal US to r/o underlying issues or conditions. Discussed with pt that her sx re likely due to GI distress. Informed pt that her sx are not likely related to vertigo due to diarrhea and metallic taste in her mouth. Informed pt that if her sx persist and the labs are clear she will need to see a GI. Will continue to monitor for improvements or changes. -     CBC WITH AUTOMATED DIFF  -     METABOLIC PANEL, COMPREHENSIVE  -     AMYLASE  -     LIPASE  -     US ABD COMP; Future    2. Other fatigue  Discussed with pt that her sx re likely due to GI distress. Informed pt that her sx are not likely related to vertigo due to diarrhea and metallic taste in her mouth. Informed pt that if her sx persist and the labs are clear she will need to see a GI. Will continue to monitor for improvements or changes.  -     TSH 3RD GENERATION  -     US ABD COMP; Future    3. Diarrhea, unspecified type  Discussed with pt that her sx re likely due to GI distress. Informed pt that her sx are not likely related to vertigo due to diarrhea and metallic taste in her mouth. Informed pt that if her sx persist and the labs are clear she will need to see a GI.  Will continue to monitor for improvements or changes.  -     TSH 3RD GENERATION    Lab results and schedule of future lab studies reviewed with patient. Reviewed diet, exercise and weight control. Written by Davion Osuna, as dictated by Susie Angeles MD.     Current diagnosis and concerns discussed with pt at length. Understands risks and benefits or current treatment plan and medications and accepts the treatment and medication with any possible risks. Pt asks appropriate questions which were answered. Pt instructed to call with any concerns or problems. This note will not be viewable in [address]

## 2019-08-30 LAB
ALBUMIN SERPL-MCNC: 4.3 G/DL (ref 3.5–5.5)
ALBUMIN/GLOB SERPL: 1.7 {RATIO} (ref 1.2–2.2)
ALP SERPL-CCNC: 78 IU/L (ref 39–117)
ALT SERPL-CCNC: 13 IU/L (ref 0–32)
AMYLASE SERPL-CCNC: 89 U/L (ref 31–124)
AST SERPL-CCNC: 13 IU/L (ref 0–40)
BASOPHILS # BLD AUTO: 0 X10E3/UL (ref 0–0.2)
BASOPHILS NFR BLD AUTO: 1 %
BILIRUB SERPL-MCNC: 0.5 MG/DL (ref 0–1.2)
BUN SERPL-MCNC: 16 MG/DL (ref 6–24)
BUN/CREAT SERPL: 18 (ref 9–23)
CALCIUM SERPL-MCNC: 9.5 MG/DL (ref 8.7–10.2)
CHLORIDE SERPL-SCNC: 107 MMOL/L (ref 96–106)
CO2 SERPL-SCNC: 25 MMOL/L (ref 20–29)
CREAT SERPL-MCNC: 0.88 MG/DL (ref 0.57–1)
EOSINOPHIL # BLD AUTO: 0.1 X10E3/UL (ref 0–0.4)
EOSINOPHIL NFR BLD AUTO: 3 %
ERYTHROCYTE [DISTWIDTH] IN BLOOD BY AUTOMATED COUNT: 12.4 % (ref 12.3–15.4)
GLOBULIN SER CALC-MCNC: 2.5 G/DL (ref 1.5–4.5)
GLUCOSE SERPL-MCNC: 71 MG/DL (ref 65–99)
HCT VFR BLD AUTO: 41.5 % (ref 34–46.6)
HGB BLD-MCNC: 14.1 G/DL (ref 11.1–15.9)
IMM GRANULOCYTES # BLD AUTO: 0 X10E3/UL (ref 0–0.1)
IMM GRANULOCYTES NFR BLD AUTO: 0 %
LIPASE SERPL-CCNC: 23 U/L (ref 14–72)
LYMPHOCYTES # BLD AUTO: 1.4 X10E3/UL (ref 0.7–3.1)
LYMPHOCYTES NFR BLD AUTO: 33 %
MCH RBC QN AUTO: 29.6 PG (ref 26.6–33)
MCHC RBC AUTO-ENTMCNC: 34 G/DL (ref 31.5–35.7)
MCV RBC AUTO: 87 FL (ref 79–97)
MONOCYTES # BLD AUTO: 0.3 X10E3/UL (ref 0.1–0.9)
MONOCYTES NFR BLD AUTO: 8 %
NEUTROPHILS # BLD AUTO: 2.4 X10E3/UL (ref 1.4–7)
NEUTROPHILS NFR BLD AUTO: 55 %
PLATELET # BLD AUTO: 250 X10E3/UL (ref 150–450)
POTASSIUM SERPL-SCNC: 4.4 MMOL/L (ref 3.5–5.2)
PROT SERPL-MCNC: 6.8 G/DL (ref 6–8.5)
RBC # BLD AUTO: 4.76 X10E6/UL (ref 3.77–5.28)
SODIUM SERPL-SCNC: 146 MMOL/L (ref 134–144)
TSH SERPL DL<=0.005 MIU/L-ACNC: 3.19 UIU/ML (ref 0.45–4.5)
WBC # BLD AUTO: 4.3 X10E3/UL (ref 3.4–10.8)

## 2019-10-10 ENCOUNTER — OFFICE VISIT (OUTPATIENT)
Dept: INTERNAL MEDICINE CLINIC | Age: 59
End: 2019-10-10

## 2019-10-10 VITALS
SYSTOLIC BLOOD PRESSURE: 102 MMHG | HEART RATE: 61 BPM | DIASTOLIC BLOOD PRESSURE: 71 MMHG | TEMPERATURE: 97.6 F | WEIGHT: 152.2 LBS | HEIGHT: 66 IN | BODY MASS INDEX: 24.46 KG/M2 | RESPIRATION RATE: 15 BRPM | OXYGEN SATURATION: 99 %

## 2019-10-10 DIAGNOSIS — Z00.00 ROUTINE GENERAL MEDICAL EXAMINATION AT A HEALTH CARE FACILITY: Primary | ICD-10-CM

## 2019-10-10 NOTE — PROGRESS NOTES
HISTORY OF PRESENT ILLNESS  Viviana Winslow is a 64 y.o. female. HPI  Her previous symptoms seemed to be related to odorless fish oil as soon as she stopped it she felt better but still not sure if that was the case or related to virus      She does not have asthma - she did need an inhaler last spring as she     She tries to do something every day     Review of Systems   Constitutional: Negative for chills, diaphoresis, fever, malaise/fatigue and weight loss. HENT: Negative for congestion, ear pain, hearing loss, sore throat and tinnitus. Eyes: Negative for blurred vision and double vision. Respiratory: Negative for cough, hemoptysis, sputum production, shortness of breath and wheezing. Cardiovascular: Negative for chest pain, palpitations, orthopnea, claudication, leg swelling and PND. Gastrointestinal: Negative for abdominal pain, blood in stool, constipation, diarrhea, heartburn, nausea and vomiting. Genitourinary: Negative for dysuria, flank pain, frequency, hematuria and urgency. Musculoskeletal: Negative for back pain, joint pain, myalgias and neck pain. Skin: Negative. Neurological: Negative for dizziness, tingling, sensory change, speech change, focal weakness, seizures, loss of consciousness, weakness and headaches. Endo/Heme/Allergies: Negative for environmental allergies and polydipsia. Does not bruise/bleed easily. Psychiatric/Behavioral: Negative for depression, memory loss, substance abuse and suicidal ideas. The patient is not nervous/anxious and does not have insomnia. Physical Exam   Constitutional: She is oriented to person, place, and time. She appears well-developed and well-nourished. HENT:   Head: Normocephalic and atraumatic. Right Ear: External ear normal.   Left Ear: External ear normal.   Nose: Nose normal.   Mouth/Throat: Oropharynx is clear and moist.   Eyes: Pupils are equal, round, and reactive to light.  Conjunctivae and EOM are normal.   Neck: Normal range of motion. Neck supple. Cardiovascular: Normal rate, regular rhythm, normal heart sounds and intact distal pulses. Pulmonary/Chest: Effort normal and breath sounds normal. Right breast exhibits no inverted nipple, no mass, no nipple discharge, no skin change and no tenderness. Left breast exhibits no inverted nipple, no mass, no nipple discharge, no skin change and no tenderness. No breast swelling, tenderness, discharge or bleeding. Breasts are symmetrical.   Abdominal: Soft. Bowel sounds are normal.   Genitourinary: Rectum normal. Rectal exam shows anal tone normal. No breast swelling, tenderness, discharge or bleeding. Musculoskeletal: Normal range of motion. Neurological: She is alert and oriented to person, place, and time. Skin: Skin is warm and dry. Psychiatric: She has a normal mood and affect. Her behavior is normal. Judgment and thought content normal.   Nursing note and vitals reviewed. ASSESSMENT and PLAN  Diagnoses and all orders for this visit:    1. Routine general medical examination at a health care facility  -     LIPID PANEL    This note will not be viewable in [employer].         lab results and schedule of future lab studies reviewed with patient  reviewed diet, exercise and weight control  cardiovascular risk and specific lipid/LDL goals reviewed  reviewed medications and side effects in detail

## 2019-10-11 LAB
CHOLEST SERPL-MCNC: 202 MG/DL (ref 100–199)
HDLC SERPL-MCNC: 52 MG/DL
INTERPRETATION, 910389: NORMAL
LDLC SERPL CALC-MCNC: 133 MG/DL (ref 0–99)
TRIGL SERPL-MCNC: 84 MG/DL (ref 0–149)
VLDLC SERPL CALC-MCNC: 17 MG/DL (ref 5–40)

## 2020-02-07 ENCOUNTER — OFFICE VISIT (OUTPATIENT)
Dept: INTERNAL MEDICINE CLINIC | Age: 60
End: 2020-02-07

## 2020-02-07 VITALS
WEIGHT: 155 LBS | TEMPERATURE: 97.7 F | DIASTOLIC BLOOD PRESSURE: 69 MMHG | OXYGEN SATURATION: 97 % | BODY MASS INDEX: 24.91 KG/M2 | SYSTOLIC BLOOD PRESSURE: 108 MMHG | RESPIRATION RATE: 14 BRPM | HEART RATE: 63 BPM | HEIGHT: 66 IN

## 2020-02-07 DIAGNOSIS — J01.40 ACUTE PANSINUSITIS, RECURRENCE NOT SPECIFIED: ICD-10-CM

## 2020-02-07 DIAGNOSIS — R68.89 FLU-LIKE SYMPTOMS: Primary | ICD-10-CM

## 2020-02-07 LAB
FLUAV+FLUBV AG NOSE QL IA.RAPID: NEGATIVE POS/NEG
FLUAV+FLUBV AG NOSE QL IA.RAPID: NEGATIVE POS/NEG
VALID INTERNAL CONTROL?: YES

## 2020-02-07 RX ORDER — CEFDINIR 300 MG/1
300 CAPSULE ORAL 2 TIMES DAILY
Qty: 20 CAP | Refills: 0 | Status: SHIPPED | OUTPATIENT
Start: 2020-02-07 | End: 2020-11-11

## 2020-02-07 NOTE — PATIENT INSTRUCTIONS
Sinusitis: Care Instructions  Your Care Instructions    Sinusitis is an infection of the lining of the sinus cavities in your head. Sinusitis often follows a cold. It causes pain and pressure in your head and face. In most cases, sinusitis gets better on its own in 1 to 2 weeks. But some mild symptoms may last for several weeks. Sometimes antibiotics are needed. Follow-up care is a key part of your treatment and safety. Be sure to make and go to all appointments, and call your doctor if you are having problems. It's also a good idea to know your test results and keep a list of the medicines you take. How can you care for yourself at home? · Take an over-the-counter pain medicine, such as acetaminophen (Tylenol), ibuprofen (Advil, Motrin), or naproxen (Aleve). Read and follow all instructions on the label. · If the doctor prescribed antibiotics, take them as directed. Do not stop taking them just because you feel better. You need to take the full course of antibiotics. · Be careful when taking over-the-counter cold or flu medicines and Tylenol at the same time. Many of these medicines have acetaminophen, which is Tylenol. Read the labels to make sure that you are not taking more than the recommended dose. Too much acetaminophen (Tylenol) can be harmful. · Breathe warm, moist air from a steamy shower, a hot bath, or a sink filled with hot water. Avoid cold, dry air. Using a humidifier in your home may help. Follow the directions for cleaning the machine. · Use saline (saltwater) nasal washes to help keep your nasal passages open and wash out mucus and bacteria. You can buy saline nose drops at a grocery store or drugstore. Or you can make your own at home by adding 1 teaspoon of salt and 1 teaspoon of baking soda to 2 cups of distilled water. If you make your own, fill a bulb syringe with the solution, insert the tip into your nostril, and squeeze gently. Ed Bees your nose.   · Put a hot, wet towel or a warm gel pack on your face 3 or 4 times a day for 5 to 10 minutes each time. · Try a decongestant nasal spray like oxymetazoline (Afrin). Do not use it for more than 3 days in a row. Using it for more than 3 days can make your congestion worse. When should you call for help? Call your doctor now or seek immediate medical care if:    · You have new or worse swelling or redness in your face or around your eyes.     · You have a new or higher fever.    Watch closely for changes in your health, and be sure to contact your doctor if:    · You have new or worse facial pain.     · The mucus from your nose becomes thicker (like pus) or has new blood in it.     · You are not getting better as expected. Where can you learn more? Go to http://brent-goran.info/. Enter U334 in the search box to learn more about \"Sinusitis: Care Instructions. \"  Current as of: October 21, 2018  Content Version: 12.2  © 6877-1445 [employer]. Care instructions adapted under license by [employer] (which disclaims liability or warranty for this information). If you have questions about a medical condition or this instruction, always ask your healthcare professional. [place] [address] any warranty or liability for your use of this information.

## 2020-02-07 NOTE — PROGRESS NOTES
HISTORY OF PRESENT ILLNESS  Jossy Sánchez is a 64 y.o. female. HPI  Upper respiratory illness:  Jossy Sánchez presents with complaints of congestion, sore throat, post nasal drip, dry cough, myalgias, headache, hot and cold spells and thick yellow nasal discharge for 6 days. no nausea and no vomiting . she has not had  Shortness of breath or wheezing. Symptoms are moderate. Over-the-counter remedies including Advil, Dayquil, Nyquil   has been used with good relief of symptoms. Drinking plenty of fluids: yes  Asthma?:  no  non-smoker  Contacts with similar infections: yes         Review of Systems   Constitutional: Positive for chills, fever and malaise/fatigue. HENT: Positive for congestion, sinus pain and sore throat. Respiratory: Positive for cough. Negative for sputum production and shortness of breath. Cardiovascular: Negative for chest pain and palpitations. Gastrointestinal: Negative for nausea and vomiting. Musculoskeletal: Positive for myalgias. Skin: Negative for rash. Neurological: Positive for headaches. Negative for dizziness. /69 (BP 1 Location: Left arm, BP Patient Position: Sitting)   Pulse 63   Temp 97.7 °F (36.5 °C) (Oral)   Resp 14   Ht 5' 6\" (1.676 m)   Wt 155 lb (70.3 kg)   LMP 02/28/2017   SpO2 97%   BMI 25.02 kg/m²   Physical Exam  Vitals signs and nursing note reviewed. Constitutional:       Appearance: Normal appearance. She is ill-appearing. HENT:      Head: Normocephalic and atraumatic. Right Ear: Tympanic membrane, ear canal and external ear normal.      Left Ear: Tympanic membrane, ear canal and external ear normal.      Nose: Congestion present. Right Sinus: Maxillary sinus tenderness present. Left Sinus: Maxillary sinus tenderness present. Mouth/Throat:      Mouth: Mucous membranes are moist.      Pharynx: Posterior oropharyngeal erythema present. Neck:      Musculoskeletal: Normal range of motion and neck supple. Cardiovascular:      Rate and Rhythm: Normal rate and regular rhythm. Pulmonary:      Effort: Pulmonary effort is normal.      Breath sounds: Normal breath sounds. No wheezing or rhonchi. Lymphadenopathy:      Cervical: No cervical adenopathy. Skin:     General: Skin is warm and dry. Neurological:      General: No focal deficit present. Mental Status: She is alert and oriented to person, place, and time. ASSESSMENT and PLAN  Diagnoses and all orders for this visit:    1. Flu-like symptoms  -     AMB POC AZAEL INFLUENZA A/B TEST -- negative    2. Acute pansinusitis, recurrence not specified  -     cefdinir (OMNICEF) 300 mg capsule; Take 1 Cap by mouth two (2) times a day.       lab results and schedule of future lab studies reviewed with patient  reviewed diet, exercise and weight control  reviewed medications and side effects in detail

## 2020-11-11 ENCOUNTER — OFFICE VISIT (OUTPATIENT)
Dept: INTERNAL MEDICINE CLINIC | Age: 60
End: 2020-11-11
Payer: COMMERCIAL

## 2020-11-11 VITALS
SYSTOLIC BLOOD PRESSURE: 121 MMHG | HEART RATE: 79 BPM | RESPIRATION RATE: 12 BRPM | BODY MASS INDEX: 24.59 KG/M2 | DIASTOLIC BLOOD PRESSURE: 84 MMHG | WEIGHT: 153 LBS | TEMPERATURE: 98.2 F | HEIGHT: 66 IN | OXYGEN SATURATION: 98 %

## 2020-11-11 DIAGNOSIS — M65.4 DE QUERVAIN'S DISEASE (TENOSYNOVITIS): Primary | ICD-10-CM

## 2020-11-11 DIAGNOSIS — M67.40 GANGLION CYST: ICD-10-CM

## 2020-11-11 PROCEDURE — 99213 OFFICE O/P EST LOW 20 MIN: CPT | Performed by: INTERNAL MEDICINE

## 2020-11-11 NOTE — PROGRESS NOTES
Assessment and Plan   Diagnoses and all orders for this visit:    1. De Quervain's disease (tenosynovitis)  2. Ganglion cyst  Presents to clinic for 2-week history of left wrist pain. Reports she has had a lateral cysts present for several years that will come and go. Cyst has come back and has started having pain around her thumb and lateral forearm. 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 Finkelstein positive. Symptoms likely secondary to tenosynovitis versus ganglion cyst.  Recommend a few days of anti-inflammatory medications and ice. Would recommend a brace but I think that would irritate the ganglion cyst.  Recommend modifying how she holds her wrist when she is typing to decrease strain. If no improvement, consider orthopedic referral    Benefits, risks, possible drug interactions, and side effects of all new medications were reviewed with the patient. Pt verbalized understanding. Return to clinic: As scheduled    Donald Medina MD  [facility]  11/11/2020    Future Appointments   Date Time Provider Nicci Mel   3/24/2021  8:30 AM [facility]        Subjective   Chief Complaint   Left wrist pain    Dandre Mcneil is a 64 y.o. female         Review of Systems   Constitutional: Negative for chills and fever. Respiratory: Negative for shortness of breath. Cardiovascular: Negative for chest pain. Objective   Vitals:       Visit Vitals  /84 (BP 1 Location: Left arm, BP Patient Position: Sitting)   Pulse 79   Temp 98.2 °F (36.8 °C) (Oral)   Resp 12   Ht 5' 6\" (1.676 m)   Wt 153 lb (69.4 kg)   LMP 02/28/2017   SpO2 98%   BMI 24.69 kg/m²        Physical Exam  Constitutional:       Appearance: Normal appearance. She is not ill-appearing. Cardiovascular:      Rate and Rhythm: Normal rate. Pulmonary:      Effort: No respiratory distress.    Musculoskeletal:      Comments: Normal range of motion of the wrist.  Pea-sized hard subcutaneous nodule noted left lateral wrist.  Finkelstein sign positive. No significant swelling or erythema noted   Neurological:      Mental Status: She is alert. Gait: Gait normal.   Psychiatric:         Mood and Affect: Mood normal.         Thought Content: Thought content normal.         Judgment: Judgment normal.          Current Outpatient Medications   Medication Sig    TURMERIC PO Take  by mouth.  MAGNESIUM PO Take 1 Tab by mouth nightly.  albuterol (PROVENTIL HFA, VENTOLIN HFA, PROAIR HFA) 90 mcg/actuation inhaler Take 2 Puffs by inhalation every six (6) hours as needed for Wheezing.  ibuprofen (MOTRIN) 600 mg tablet Take 1 Tab by mouth every eight (8) hours as needed for Pain.  MULTIVITAMIN (VITAMIN A DAY PO) Take  by mouth.  omega-3 fatty acids-vitamin e (FISH OIL) 1,000 mg cap Take 2 Caps by mouth.  Cetirizine (ZYRTEC) 10 mg cap Take 1 Tab by mouth daily.  CYANOCOBALAMIN, VITAMIN B-12, (VITAMIN B-12 PO) Take  by mouth. No current facility-administered medications for this visit.

## 2020-11-11 NOTE — PROGRESS NOTES
Arcelia Zavala is a 64 y.o. female    Chief Complaint   Patient presents with    Wrist Pain     bump left side x couple of weeks        Health Maintenance Due   Topic Date Due    PAP AKA CERVICAL CYTOLOGY  04/01/2020    Flu Vaccine (1) 09/01/2020       Visit Vitals  /84 (BP 1 Location: Left arm, BP Patient Position: Sitting)   Pulse 79   Temp 98.2 °F (36.8 °C) (Oral)   Resp 12   Ht 5' 6\" (1.676 m)   Wt 153 lb (69.4 kg)   LMP 02/28/2017   SpO2 98%   BMI 24.69 kg/m²       3 most recent PHQ Screens 11/11/2020   Little interest or pleasure in doing things Not at all   Feeling down, depressed, irritable, or hopeless Not at all   Total Score PHQ 2 0       Fall Risk Assessment, last 12 mths 2/7/2020   Able to walk? Yes   Fall in past 12 months? Yes   Fall with injury? Yes   Number of falls in past 12 months 1   Fall Risk Score 2       Abuse Screening Questionnaire 11/11/2020   Do you ever feel afraid of your partner? N   Are you in a relationship with someone who physically or mentally threatens you? N   Is it safe for you to go home? Y         1. Have you been to the ER, urgent care clinic since your last visit? Hospitalized since your last visit?no    2. Have you seen or consulted any other health care providers outside of the [address] [place] [address] since your last visit? Include any pap smears or colon screening.  no

## 2021-01-19 ENCOUNTER — TELEPHONE (OUTPATIENT)
Dept: INTERNAL MEDICINE CLINIC | Age: 61
End: 2021-01-19

## 2021-01-19 NOTE — TELEPHONE ENCOUNTER
----- Message from Brennan Villa sent at 1/19/2021  3:57 PM EST -----  Regarding: Telephone  General Message/Vendor Calls    Caller's first and last name: Zaida Collin       Reason for call: PT just wanted to let the doctor know that she tested positive for Covid on January 13th       Callback required yes/no and why: No, she just wanted to inform the doctor       Best contact number(s): [phone]      Details to clarify the request:N/A       Brennan Villa

## 2021-02-22 ENCOUNTER — TRANSCRIBE ORDER (OUTPATIENT)
Dept: SCHEDULING | Age: 61
End: 2021-02-22

## 2021-02-22 DIAGNOSIS — Z12.31 SCREENING MAMMOGRAM FOR HIGH-RISK PATIENT: Primary | ICD-10-CM

## 2021-03-31 ENCOUNTER — HOSPITAL ENCOUNTER (OUTPATIENT)
Dept: MAMMOGRAPHY | Age: 61
Discharge: HOME | End: 2021-03-31
Attending: OBSTETRICS & GYNECOLOGY
Payer: COMMERCIAL

## 2021-03-31 DIAGNOSIS — Z12.31 SCREENING MAMMOGRAM FOR HIGH-RISK PATIENT: ICD-10-CM

## 2021-03-31 PROCEDURE — 77063 BREAST TOMOSYNTHESIS BI: CPT

## 2021-04-26 ENCOUNTER — TELEPHONE (OUTPATIENT)
Dept: INTERNAL MEDICINE CLINIC | Age: 61
End: 2021-04-26

## 2021-04-26 NOTE — TELEPHONE ENCOUNTER
----- Message from Tripp Lobo sent at 4/26/2021  9:42 AM EDT -----  Regarding: telephone  General Message/Vendor Calls    Caller's first and last name: PT       Reason for call: PT wants to know if you can send over a work order to the lab so she can have lab work done before her next appointment       Callback required yes/no and why: yes       Best contact number(s): [phone]      Details to clarify the request: n/a       Tripp Lobo

## 2021-04-28 ENCOUNTER — OFFICE VISIT (OUTPATIENT)
Dept: INTERNAL MEDICINE CLINIC | Age: 61
End: 2021-04-28
Payer: COMMERCIAL

## 2021-04-28 ENCOUNTER — HOSPITAL ENCOUNTER (OUTPATIENT)
Dept: LAB | Age: 61
Discharge: HOME | End: 2021-04-28
Payer: COMMERCIAL

## 2021-04-28 VITALS
HEART RATE: 68 BPM | BODY MASS INDEX: 24.59 KG/M2 | RESPIRATION RATE: 16 BRPM | HEIGHT: 66 IN | SYSTOLIC BLOOD PRESSURE: 106 MMHG | DIASTOLIC BLOOD PRESSURE: 73 MMHG | OXYGEN SATURATION: 96 % | TEMPERATURE: 98.5 F | WEIGHT: 153 LBS

## 2021-04-28 DIAGNOSIS — Z01.419 WELL WOMAN EXAM WITH ROUTINE GYNECOLOGICAL EXAM: Primary | ICD-10-CM

## 2021-04-28 DIAGNOSIS — Z98.890 HX OF COLONOSCOPY: ICD-10-CM

## 2021-04-28 PROBLEM — D24.9 FIBROADENOMA OF BREAST: Status: ACTIVE | Noted: 2019-03-03

## 2021-04-28 PROCEDURE — 99396 PREV VISIT EST AGE 40-64: CPT | Performed by: INTERNAL MEDICINE

## 2021-04-28 PROCEDURE — 87624 HPV HI-RISK TYP POOLED RSLT: CPT

## 2021-04-28 NOTE — PROGRESS NOTES
HISTORY OF PRESENT ILLNESS  Tod Baker is a 63 y.o. female. HPI  Seen for physical with pap smear. She did see Je Chong about six months ago and had a pelvic, but notes she did not have a pap smear at that time. Previous paps have been normal.  She had a colonoscopy in 2015, normal, ten year repeat advised. She has completed the COVID vaccine series and is looking forward to her daughter's wedding in [place] this summer. She has been treated for DeQuervain's tenosynovitis of wrist and believes that shortly after that was when she had symptomatic COVID. Fortunately did not need hospitalization and was never severely ill and has now completely recovered with no residual symptoms noted. She is working remotely as a therapist.      Review of Systems   Constitutional: Negative for chills, diaphoresis, fever, malaise/fatigue and weight loss. Respiratory: Negative for cough, shortness of breath and wheezing. Cardiovascular: Negative for chest pain, palpitations, orthopnea, leg swelling and PND. Gastrointestinal: Negative for abdominal pain, heartburn, nausea and vomiting. Genitourinary: Negative for dysuria. Musculoskeletal: Negative for myalgias. Neurological: Negative for dizziness and headaches. Psychiatric/Behavioral: Negative for depression and memory loss. The patient does not have insomnia. All other systems reviewed and are negative. Physical Exam  Vitals signs and nursing note reviewed. Constitutional:       Appearance: She is well-developed. HENT:      Head: Normocephalic and atraumatic. Right Ear: Tympanic membrane, ear canal and external ear normal.      Left Ear: Tympanic membrane, ear canal and external ear normal.      Nose: Nose normal.      Mouth/Throat:      Pharynx: No oropharyngeal exudate. Eyes:      General:         Right eye: No discharge. Left eye: No discharge.       Conjunctiva/sclera: Conjunctivae normal.      Pupils: Pupils are equal, round, and reactive to light. Neck:      Musculoskeletal: Normal range of motion and neck supple. Thyroid: No thyromegaly. Vascular: No carotid bruit. Cardiovascular:      Rate and Rhythm: Normal rate and regular rhythm. Heart sounds: Normal heart sounds, S1 normal and S2 normal. No murmur. Pulmonary:      Effort: Pulmonary effort is normal. No respiratory distress. Breath sounds: Normal breath sounds. No wheezing or rales. Abdominal:      Palpations: Abdomen is soft. There is no mass. Tenderness: There is no abdominal tenderness. Genitourinary:     Comments: Cervix well seen  Cystic area at 4 oclock pap taken  No bimanual as she reports done by dr Lorie Cervantes in last year  Lymphadenopathy:      Cervical: No cervical adenopathy. Neurological:      Mental Status: She is alert and oriented to person, place, and time. Psychiatric:         Mood and Affect: Mood normal.         Behavior: Behavior normal.         ASSESSMENT and PLAN  Diagnoses and all orders for this visit:    1. Well woman exam with routine gynecological exam  -     CBC WITH AUTOMATED DIFF; Future  -     METABOLIC PANEL, COMPREHENSIVE; Future  -     LIPID PANEL; Future  -     TSH 3RD GENERATION; Future  -     VITAMIN D, 25 HYDROXY; Future  -     METABOLIC PANEL, COMPREHENSIVE; Future  -     LIPID PANEL; Future  -     TSH 3RD GENERATION; Future  -     CBC WITH AUTOMATED DIFF; Future  -     VITAMIN D, 25 HYDROXY; Future  -     PAP IG, APTIMA HPV AND RFX 16/18,45 ([id number]); Future    2.  Hx of colonoscopy-done in 2015 due in 2025

## 2021-04-30 LAB
25(OH)D3+25(OH)D2 SERPL-MCNC: 34.8 NG/ML (ref 30–100)
ALBUMIN SERPL-MCNC: 4.2 G/DL (ref 3.8–4.9)
ALBUMIN/GLOB SERPL: 1.6 {RATIO} (ref 1.2–2.2)
ALP SERPL-CCNC: 86 IU/L (ref 39–117)
ALT SERPL-CCNC: 14 IU/L (ref 0–32)
AST SERPL-CCNC: 17 IU/L (ref 0–40)
BASOPHILS # BLD AUTO: 0 X10E3/UL (ref 0–0.2)
BASOPHILS NFR BLD AUTO: 1 %
BILIRUB SERPL-MCNC: 0.8 MG/DL (ref 0–1.2)
BUN SERPL-MCNC: 15 MG/DL (ref 6–24)
BUN/CREAT SERPL: 17 (ref 9–23)
CALCIUM SERPL-MCNC: 9.3 MG/DL (ref 8.7–10.2)
CHLORIDE SERPL-SCNC: 104 MMOL/L (ref 96–106)
CHOLEST SERPL-MCNC: 189 MG/DL (ref 100–199)
CO2 SERPL-SCNC: 24 MMOL/L (ref 20–29)
CREAT SERPL-MCNC: 0.86 MG/DL (ref 0.57–1)
EOSINOPHIL # BLD AUTO: 0.1 X10E3/UL (ref 0–0.4)
EOSINOPHIL NFR BLD AUTO: 3 %
ERYTHROCYTE [DISTWIDTH] IN BLOOD BY AUTOMATED COUNT: 12.2 % (ref 11.7–15.4)
GLOBULIN SER CALC-MCNC: 2.6 G/DL (ref 1.5–4.5)
GLUCOSE SERPL-MCNC: 88 MG/DL (ref 65–99)
HCT VFR BLD AUTO: 43.9 % (ref 34–46.6)
HDLC SERPL-MCNC: 52 MG/DL
HGB BLD-MCNC: 14.7 G/DL (ref 11.1–15.9)
IMM GRANULOCYTES # BLD AUTO: 0 X10E3/UL (ref 0–0.1)
IMM GRANULOCYTES NFR BLD AUTO: 0 %
IMP & REVIEW OF LAB RESULTS: NORMAL
LDLC SERPL CALC-MCNC: 118 MG/DL (ref 0–99)
LYMPHOCYTES # BLD AUTO: 1.6 X10E3/UL (ref 0.7–3.1)
LYMPHOCYTES NFR BLD AUTO: 32 %
MCH RBC QN AUTO: 29.8 PG (ref 26.6–33)
MCHC RBC AUTO-ENTMCNC: 33.5 G/DL (ref 31.5–35.7)
MCV RBC AUTO: 89 FL (ref 79–97)
MONOCYTES # BLD AUTO: 0.3 X10E3/UL (ref 0.1–0.9)
MONOCYTES NFR BLD AUTO: 7 %
NEUTROPHILS # BLD AUTO: 2.9 X10E3/UL (ref 1.4–7)
NEUTROPHILS NFR BLD AUTO: 57 %
PLATELET # BLD AUTO: 258 X10E3/UL (ref 150–450)
POTASSIUM SERPL-SCNC: 4.6 MMOL/L (ref 3.5–5.2)
PROT SERPL-MCNC: 6.8 G/DL (ref 6–8.5)
RBC # BLD AUTO: 4.94 X10E6/UL (ref 3.77–5.28)
SODIUM SERPL-SCNC: 141 MMOL/L (ref 134–144)
TRIGL SERPL-MCNC: 104 MG/DL (ref 0–149)
TSH SERPL DL<=0.005 MIU/L-ACNC: 2.31 UIU/ML (ref 0.45–4.5)
VLDLC SERPL CALC-MCNC: 19 MG/DL (ref 5–40)
WBC # BLD AUTO: 5.1 X10E3/UL (ref 3.4–10.8)

## 2021-05-04 LAB
CYTOLOGIST CVX/VAG CYTO: NORMAL
CYTOLOGY CVX/VAG DOC THIN PREP: NORMAL
HPV APTIMA: NEGATIVE
Lab: NORMAL
Lab: NORMAL
PATH REPORT.FINAL DX SPEC: NORMAL
STAT OF ADQ CVX/VAG CYTO-IMP: NORMAL

## 2021-08-23 LAB — SARS-COV-2, NAA: NOT DETECTED

## 2022-03-03 ENCOUNTER — OFFICE VISIT (OUTPATIENT)
Dept: INTERNAL MEDICINE CLINIC | Age: 62
End: 2022-03-03
Payer: COMMERCIAL

## 2022-03-03 VITALS
WEIGHT: 152 LBS | DIASTOLIC BLOOD PRESSURE: 76 MMHG | RESPIRATION RATE: 14 BRPM | BODY MASS INDEX: 24.43 KG/M2 | SYSTOLIC BLOOD PRESSURE: 107 MMHG | HEART RATE: 72 BPM | HEIGHT: 66 IN | TEMPERATURE: 98.1 F | OXYGEN SATURATION: 95 %

## 2022-03-03 DIAGNOSIS — J30.9 ALLERGIC RHINITIS, UNSPECIFIED SEASONALITY, UNSPECIFIED TRIGGER: Primary | ICD-10-CM

## 2022-03-03 PROCEDURE — 99214 OFFICE O/P EST MOD 30 MIN: CPT | Performed by: INTERNAL MEDICINE

## 2022-03-03 RX ORDER — MONTELUKAST SODIUM 10 MG/1
10 TABLET ORAL DAILY
Qty: 90 TABLET | Refills: 1 | Status: SHIPPED | OUTPATIENT
Start: 2022-03-03 | End: 2022-08-24 | Stop reason: ALTCHOICE

## 2022-03-03 RX ORDER — AZELASTINE 1 MG/ML
1 SPRAY, METERED NASAL 2 TIMES DAILY
Qty: 1 EACH | Refills: 3 | Status: SHIPPED | OUTPATIENT
Start: 2022-03-03 | End: 2022-08-24 | Stop reason: ALTCHOICE

## 2022-03-03 NOTE — PATIENT INSTRUCTIONS
Switch to allertec (zyrtec)  Increase flonase to 2 sprays both nostrils once a day   Start astelin nasal spray  Start singulair

## 2022-03-03 NOTE — PROGRESS NOTES
Note   Chief Complaint   allergies    Razia Sal is a 63 y.o. female     1. Allergic rhinitis, unspecified seasonality, unspecified trigger  -     montelukast (SINGULAIR) 10 mg tablet; Take 1 Tablet by mouth daily. Indications: allergies, Normal, Disp-90 Tablet, R-1  -     azelastine (ASTELIN) 137 mcg (0.1 %) nasal spray; 1 [place] by Both Nostrils route two (2) times a day. Use in each nostril as directed, Normal, Disp-1 Each, R-3   Issue since January  Thinks maybe it's related to something in her house   Keeps clearing throat, gets irritated when clearing it  Went to [facility] and symptoms cleared up   [employer] to [place] last month and the apt had mold and sx acted up   [employer] from [facility] to [facility] like has a lot of congestion in ears  Has used mucinex DM guaf/dex   Talks for a living   flonase 1 spray each nostril   No sinus pain   Chronic problem not well controlled   Switch to allertec (zyrtec)  Increase flonase to 2 sprays both nostrils once a day, advised on proper administration  Start astelin nasal spray  Start singulair   Advised to call if sx do not improve        Benefits, risks, possible drug interactions, and side effects of all new medications were reviewed with the patient. Pt verbalized understanding. Return to clinic:  As needed    An electronic signature was used to authenticate this note. Elieser Rodriguez MD  [facility]  3/3/2022    No future appointments. Objective   Vitals:       Visit Vitals  /76 (BP 1 Location: Left upper arm, BP Patient Position: Sitting, BP Cuff Size: Adult)   Pulse 72   Temp 98.1 °F (36.7 °C) (Temporal)   Resp 14   Ht 5' 6\" (1.676 m)   Wt 152 lb (68.9 kg)   LMP 02/28/2017   SpO2 95%   BMI 24.53 kg/m²        Physical Exam  Constitutional:       Appearance: Normal appearance. She is not ill-appearing.    HENT:      Right Ear: Ear canal and external ear normal.      Left Ear: Tympanic membrane, ear canal and external ear normal.      Ears:      Comments: Right tm mildly retracted     Mouth/Throat:      Mouth: Mucous membranes are dry. Pharynx: No posterior oropharyngeal erythema. Cardiovascular:      Rate and Rhythm: Normal rate and regular rhythm. Heart sounds: No murmur heard. No friction rub. No gallop. Pulmonary:      Effort: No respiratory distress. Breath sounds: Normal breath sounds. No wheezing, rhonchi or rales. Neurological:      Mental Status: She is alert. Current Outpatient Medications   Medication Sig    montelukast (SINGULAIR) 10 mg tablet Take 1 Tablet by mouth daily. Indications: allergies    azelastine (ASTELIN) 137 mcg (0.1 %) nasal spray 1 [place] by Both Nostrils route two (2) times a day. Use in each nostril as directed    MAGNESIUM PO Take 1 Tab by mouth nightly.  ibuprofen (MOTRIN) 600 mg tablet Take 1 Tab by mouth every eight (8) hours as needed for Pain.  MULTIVITAMIN (VITAMIN A DAY PO) Take  by mouth.  omega-3 fatty acids-vitamin e (FISH OIL) 1,000 mg cap Take 2 Caps by mouth.  Cetirizine (ZYRTEC) 10 mg cap Take 1 Tab by mouth daily.  CYANOCOBALAMIN, VITAMIN B-12, (VITAMIN B-12 PO) Take  by mouth. No current facility-administered medications for this visit.

## 2022-03-18 ENCOUNTER — TRANSCRIBE ORDER (OUTPATIENT)
Dept: SCHEDULING | Age: 62
End: 2022-03-18

## 2022-03-18 DIAGNOSIS — Z12.31 SCREENING MAMMOGRAM FOR HIGH-RISK PATIENT: Primary | ICD-10-CM

## 2022-03-19 PROBLEM — D24.9 FIBROADENOMA OF BREAST: Status: ACTIVE | Noted: 2019-03-03

## 2022-03-19 PROBLEM — Z98.890 HX OF COLONOSCOPY: Status: ACTIVE | Noted: 2021-04-28

## 2022-05-11 ENCOUNTER — HOSPITAL ENCOUNTER (OUTPATIENT)
Dept: MAMMOGRAPHY | Age: 62
Discharge: HOME | End: 2022-05-11
Attending: OBSTETRICS & GYNECOLOGY
Payer: COMMERCIAL

## 2022-05-11 DIAGNOSIS — Z12.31 SCREENING MAMMOGRAM FOR HIGH-RISK PATIENT: ICD-10-CM

## 2022-05-11 PROCEDURE — 77063 BREAST TOMOSYNTHESIS BI: CPT

## 2022-07-18 ENCOUNTER — TELEPHONE (OUTPATIENT)
Dept: INTERNAL MEDICINE CLINIC | Age: 62
End: 2022-07-18

## 2022-07-18 NOTE — TELEPHONE ENCOUNTER
I spoke with patient to advise most providers order labs at the time of the appt. Providers may add on additional labs other than the \"routine\" labs. The lab charges a drawing fee, pt will then be charged twice if she has some labs drawn prior to appt and some after the appt. Pt would janette to wait to have labs drawn.

## 2022-07-18 NOTE — TELEPHONE ENCOUNTER
----- Message from Marge Sudha sent at 7/18/2022  3:28 PM EDT -----  Subject: Referral Request    Reason for referral request? Patient scheduled for 8/12 for physical,   wants blood work ordered at least a week before so the results will be   ready. Provider patient wants to be referred to(if known):     Provider Phone Number(if known):     Additional Information for Provider?   ---------------------------------------------------------------------------  --------------  [phone]    [phone]; OK to leave message on voicemail  ---------------------------------------------------------------------------  --------------

## 2022-08-24 ENCOUNTER — OFFICE VISIT (OUTPATIENT)
Dept: INTERNAL MEDICINE CLINIC | Age: 62
End: 2022-08-24
Payer: COMMERCIAL

## 2022-08-24 VITALS
HEART RATE: 66 BPM | OXYGEN SATURATION: 98 % | WEIGHT: 154.2 LBS | BODY MASS INDEX: 24.78 KG/M2 | HEIGHT: 66 IN | SYSTOLIC BLOOD PRESSURE: 108 MMHG | TEMPERATURE: 97.3 F | DIASTOLIC BLOOD PRESSURE: 73 MMHG | RESPIRATION RATE: 14 BRPM

## 2022-08-24 DIAGNOSIS — M65.4 DE QUERVAIN'S TENOSYNOVITIS, LEFT: ICD-10-CM

## 2022-08-24 DIAGNOSIS — R25.2 LEG CRAMPS: ICD-10-CM

## 2022-08-24 DIAGNOSIS — Z00.00 WELL WOMAN EXAM (NO GYNECOLOGICAL EXAM): Primary | ICD-10-CM

## 2022-08-24 DIAGNOSIS — Z12.11 SCREENING FOR COLON CANCER: ICD-10-CM

## 2022-08-24 DIAGNOSIS — M19.042 OSTEOARTHRITIS OF METACARPOPHALANGEAL (MCP) JOINT OF LEFT THUMB: ICD-10-CM

## 2022-08-24 LAB
ALBUMIN SERPL-MCNC: 3.8 G/DL (ref 3.5–5)
ALBUMIN/GLOB SERPL: 1.3 {RATIO} (ref 1.1–2.2)
ALP SERPL-CCNC: 81 U/L (ref 45–117)
ALT SERPL-CCNC: 22 U/L (ref 12–78)
ANION GAP SERPL CALC-SCNC: 4 MMOL/L (ref 5–15)
AST SERPL-CCNC: 12 U/L (ref 15–37)
BASOPHILS # BLD: 0 K/UL (ref 0–0.1)
BASOPHILS NFR BLD: 1 % (ref 0–1)
BILIRUB SERPL-MCNC: 0.6 MG/DL (ref 0.2–1)
BUN SERPL-MCNC: 19 MG/DL (ref 6–20)
BUN/CREAT SERPL: 21 (ref 12–20)
CALCIUM SERPL-MCNC: 9 MG/DL (ref 8.5–10.1)
CHLORIDE SERPL-SCNC: 108 MMOL/L (ref 97–108)
CHOLEST SERPL-MCNC: 200 MG/DL
CO2 SERPL-SCNC: 28 MMOL/L (ref 21–32)
CREAT SERPL-MCNC: 0.89 MG/DL (ref 0.55–1.02)
DIFFERENTIAL METHOD BLD: NORMAL
EOSINOPHIL # BLD: 0.1 K/UL (ref 0–0.4)
EOSINOPHIL NFR BLD: 2 % (ref 0–7)
ERYTHROCYTE [DISTWIDTH] IN BLOOD BY AUTOMATED COUNT: 12.7 % (ref 11.5–14.5)
GLOBULIN SER CALC-MCNC: 3 G/DL (ref 2–4)
GLUCOSE SERPL-MCNC: 87 MG/DL (ref 65–100)
HCT VFR BLD AUTO: 42.8 % (ref 35–47)
HDLC SERPL-MCNC: 57 MG/DL
HDLC SERPL: 3.5 {RATIO} (ref 0–5)
HGB BLD-MCNC: 14.3 G/DL (ref 11.5–16)
IMM GRANULOCYTES # BLD AUTO: 0 K/UL (ref 0–0.04)
IMM GRANULOCYTES NFR BLD AUTO: 0 % (ref 0–0.5)
LDLC SERPL CALC-MCNC: 122.6 MG/DL (ref 0–100)
LYMPHOCYTES # BLD: 1.6 K/UL (ref 0.8–3.5)
LYMPHOCYTES NFR BLD: 29 % (ref 12–49)
MAGNESIUM SERPL-MCNC: 2.2 MG/DL (ref 1.6–2.4)
MCH RBC QN AUTO: 30 PG (ref 26–34)
MCHC RBC AUTO-ENTMCNC: 33.4 G/DL (ref 30–36.5)
MCV RBC AUTO: 89.9 FL (ref 80–99)
MONOCYTES # BLD: 0.4 K/UL (ref 0–1)
MONOCYTES NFR BLD: 6 % (ref 5–13)
NEUTS SEG # BLD: 3.6 K/UL (ref 1.8–8)
NEUTS SEG NFR BLD: 62 % (ref 32–75)
NRBC # BLD: 0 K/UL (ref 0–0.01)
NRBC BLD-RTO: 0 PER 100 WBC
PLATELET # BLD AUTO: 228 K/UL (ref 150–400)
PMV BLD AUTO: 10.5 FL (ref 8.9–12.9)
POTASSIUM SERPL-SCNC: 4.2 MMOL/L (ref 3.5–5.1)
PROT SERPL-MCNC: 6.8 G/DL (ref 6.4–8.2)
RBC # BLD AUTO: 4.76 M/UL (ref 3.8–5.2)
SODIUM SERPL-SCNC: 140 MMOL/L (ref 136–145)
T4 FREE SERPL-MCNC: 1 NG/DL (ref 0.8–1.5)
TRIGL SERPL-MCNC: 102 MG/DL (ref ?–150)
TSH SERPL DL<=0.05 MIU/L-ACNC: 2.52 UIU/ML (ref 0.36–3.74)
VLDLC SERPL CALC-MCNC: 20.4 MG/DL
WBC # BLD AUTO: 5.7 K/UL (ref 3.6–11)

## 2022-08-24 PROCEDURE — 99213 OFFICE O/P EST LOW 20 MIN: CPT | Performed by: NURSE PRACTITIONER

## 2022-08-24 PROCEDURE — 99396 PREV VISIT EST AGE 40-64: CPT | Performed by: NURSE PRACTITIONER

## 2022-08-25 NOTE — PROGRESS NOTES
HISTORY OF PRESENT ILLNESS  Jillyn Lennox is a 63 y.o. female. HPI  Jillyn Lennox is here for complete health maintenance physical exam and screening. she does not have other concerns. Health maintenance hx includes:  Exercise: moderately active. Form of exercise: walking   Diet: generally follows a low fat low cholesterol diet  Working remotely as a therapist  Cancer screening:    Colon cancer screening:  Last Colonoscopy: 2017 and was normal   Breast cancer screening: last mammogram 2022 and was normal   Cervical cancer screening: last PAP/Pelvic exam: 2021 and was normal.    Has seen Dr Vang Form with [facility] for Left wrist DeQuervain's tenosynovitis and received cortisone injection with some improvement. Has noted some increase in discomfort recently and also have tenderness to base of left thumb. Has had some muscle cramping in bilateral calves that sometimes wakes her from sleep. Tries to stay well hydrated.     Lab Results   Component Value Date/Time    Cholesterol, total 189 04/29/2021 12:00 AM    HDL Cholesterol 52 04/29/2021 12:00 AM    LDL, calculated 118 (H) 04/29/2021 12:00 AM    LDL, calculated 133 (H) 10/10/2019 10:01 AM    VLDL, calculated 19 04/29/2021 12:00 AM    VLDL, calculated 17 10/10/2019 10:01 AM    Triglyceride 104 04/29/2021 12:00 AM         Lab Results   Component Value Date/Time    Glucose 88 04/29/2021 12:00 AM       Immunizations:     Immunization History   Administered Date(s) Administered    COVID-19, MODERNA BLUE border, Primary or Immunocompromised, (age 18y+), IM, 100 mcg/0.5mL 01/07/2021, 02/09/2021    COVID-19, MODERNA PURPLE border, (age 18y+ booster, 6y-11y series), IM, 50 mcg/0.5 mL 11/05/2021, 04/08/2022    Influenza Vaccine 09/13/2017    Influenza, Larena Liter, (age 10 mo+) AND AFLURIA, FLUZONE (age 1 y+), PF 09/29/2014, 09/28/2015, 10/11/2016, 10/23/2019    Influenza, FLUBLOK, (age 25 y+), PF 10/07/2020    Influenza, FLUCELVAX, (age 10 mo+), MDCK, PF 10/08/2018    Tdap 01/20/2016    Zoster Recombinant 10/10/2019, 12/23/2019      Immunization status: up to date and documented. Social History     Socioeconomic History    Marital status:      Spouse name: Not on file    Number of children: Not on file    Years of education: Not on file    Highest education level: Not on file   Occupational History    Not on file   Tobacco Use    Smoking status: Never    Smokeless tobacco: Never   Vaping Use    Vaping Use: Never used   Substance and Sexual Activity    Alcohol use: Yes     Alcohol/week: 2.0 standard drinks     Types: 2 Standard drinks or equivalent per week     Comment: 1-2 glasses/ week    Drug use: Never    Sexual activity: Yes     Partners: Male   Other Topics Concern    Not on file   Social History Narrative    Not on file     Social Determinants of Health     Financial Resource Strain: Not on file   Food Insecurity: Not on file   Transportation Needs: Not on file   Physical Activity: Not on file   Stress: Not on file   Social Connections: Not on file   Intimate Partner Violence: Not on file   Housing Stability: Not on file     Past Surgical History:   Procedure Laterality Date    HX COLONOSCOPY  2015    normal; repeat in 10 years    HX GYN  02/2013    ovarian cyst removed and uterine ablation    ANN BIOPSY BREAST STEREOTACTIC Left 1990s    Benign     Family History   Problem Relation Age of Onset    Diabetes Mother         type ii    COPD Mother     OSTEOARTHRITIS Mother     Hypertension Mother     Heart Failure Mother     Heart Disease Father     Stroke Father     Cancer Father         kidney ca     Bipolar Disorder Father     No Known Problems Son     Substance Abuse Daughter         in recovery     Current Outpatient Medications on File Prior to Visit   Medication Sig Dispense Refill    MAGNESIUM PO Take 1 Tab by mouth nightly.  MULTIVITAMIN (VITAMIN A DAY PO) Take  by mouth.         omega-3 fatty acids-vitamin e 1,000 mg cap Take 2 Caps by mouth.  Cetirizine 10 mg cap Take 1 Tab by mouth daily.  CYANOCOBALAMIN, VITAMIN B-12, (VITAMIN B-12 PO) Take  by mouth.  [DISCONTINUED] montelukast (SINGULAIR) 10 mg tablet Take 1 Tablet by mouth daily. Indications: allergies 90 Tablet 1    [DISCONTINUED] azelastine (ASTELIN) 137 mcg (0.1 %) nasal spray 1 [place] by Both Nostrils route two (2) times a day. Use in each nostril as directed 1 Each 3    [DISCONTINUED] ibuprofen (MOTRIN) 600 mg tablet Take 1 Tab by mouth every eight (8) hours as needed for Pain. 36 Tab 2     No current facility-administered medications on file prior to visit. .   Review of Systems   Constitutional:  Negative for chills, fever and malaise/fatigue. HENT:  Negative for congestion and sore throat. Respiratory:  Negative for cough and shortness of breath. Cardiovascular:  Negative for chest pain, palpitations and leg swelling. Gastrointestinal:  Negative for abdominal pain, blood in stool, constipation, diarrhea, heartburn, nausea and vomiting. Genitourinary:  Negative for frequency and urgency. Musculoskeletal:  Positive for myalgias. Skin:  Negative for rash. Neurological:  Negative for headaches. Psychiatric/Behavioral:  Negative for depression. The patient is not nervous/anxious. Physical Exam  Vitals and nursing note reviewed. Constitutional:       General: She is not in acute distress. Appearance: Normal appearance. HENT:      Head: Normocephalic and atraumatic. Right Ear: Tympanic membrane, ear canal and external ear normal.      Left Ear: Tympanic membrane, ear canal and external ear normal.      Nose: Nose normal.      Mouth/Throat:      Mouth: Mucous membranes are moist.      Pharynx: Oropharynx is clear. Cardiovascular:      Rate and Rhythm: Normal rate and regular rhythm. Pulmonary:      Effort: Pulmonary effort is normal.      Breath sounds: Normal breath sounds. Abdominal:      General: Bowel sounds are normal.      Palpations: Abdomen is soft. Tenderness: There is no abdominal tenderness. Musculoskeletal:         General: Normal range of motion. Cervical back: Normal range of motion and neck supple. Skin:     General: Skin is warm and dry. Findings: No rash. Neurological:      General: No focal deficit present. Mental Status: She is alert and oriented to person, place, and time. Psychiatric:         Mood and Affect: Mood normal.         Behavior: Behavior normal.     ASSESSMENT and PLAN  Diagnoses and all orders for this visit:    1. Well woman exam (no gynecological exam)  -     CBC WITH AUTOMATED DIFF; Future  -     METABOLIC PANEL, COMPREHENSIVE; Future  -     MAGNESIUM; Future  -     LIPID PANEL; Future  -     TSH 3RD GENERATION; Future  -     T4, FREE; Future    2. De Quervain's tenosynovitis, left  -     REFERRAL TO ORTHOPEDIC SURGERY    3. 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 Leg cramps  -     METABOLIC PANEL, COMPREHENSIVE; Future  -     MAGNESIUM; Future    5.  Screening for colon cancer  -     OCCULT BLOOD IMMUNOASSAY,DIAGNOSTIC; Future      lab results and schedule of future lab studies reviewed with patient  reviewed diet, exercise and weight control  cardiovascular risk and specific lipid/LDL goals reviewed  reviewed medications and side effects in detail  radiology results and schedule of future radiology studies reviewed with patient

## 2022-09-09 LAB — HEMOCCULT STL QL IA: NEGATIVE

## 2023-05-26 ENCOUNTER — OFFICE VISIT (OUTPATIENT)
Age: 63
End: 2023-05-26
Payer: COMMERCIAL

## 2023-05-26 ENCOUNTER — TELEPHONE (OUTPATIENT)
Age: 63
End: 2023-05-26

## 2023-05-26 VITALS
DIASTOLIC BLOOD PRESSURE: 75 MMHG | RESPIRATION RATE: 16 BRPM | BODY MASS INDEX: 24.43 KG/M2 | WEIGHT: 152 LBS | TEMPERATURE: 97.7 F | SYSTOLIC BLOOD PRESSURE: 108 MMHG | HEIGHT: 66 IN | OXYGEN SATURATION: 98 % | HEART RATE: 69 BPM

## 2023-05-26 DIAGNOSIS — J02.0 STREP THROAT: Primary | ICD-10-CM

## 2023-05-26 LAB
GROUP A STREP ANTIGEN, POC: POSITIVE
VALID INTERNAL CONTROL, POC: YES

## 2023-05-26 PROCEDURE — 99213 OFFICE O/P EST LOW 20 MIN: CPT | Performed by: NURSE PRACTITIONER

## 2023-05-26 PROCEDURE — 87880 STREP A ASSAY W/OPTIC: CPT | Performed by: NURSE PRACTITIONER

## 2023-05-26 RX ORDER — AMOXICILLIN AND CLAVULANATE POTASSIUM 875; 125 MG/1; MG/1
1 TABLET, FILM COATED ORAL 2 TIMES DAILY
Qty: 20 TABLET | Refills: 0 | Status: SHIPPED | OUTPATIENT
Start: 2023-05-26 | End: 2023-06-05

## 2023-05-26 RX ORDER — UBIDECARENONE 75 MG
50 CAPSULE ORAL DAILY
COMMUNITY

## 2023-05-26 RX ORDER — CHLORAL HYDRATE 500 MG
2 CAPSULE ORAL 2 TIMES DAILY
COMMUNITY

## 2023-05-26 ASSESSMENT — PATIENT HEALTH QUESTIONNAIRE - PHQ9
SUM OF ALL RESPONSES TO PHQ QUESTIONS 1-9: 0
SUM OF ALL RESPONSES TO PHQ9 QUESTIONS 1 & 2: 0
SUM OF ALL RESPONSES TO PHQ QUESTIONS 1-9: 0
SUM OF ALL RESPONSES TO PHQ QUESTIONS 1-9: 0
2. FEELING DOWN, DEPRESSED OR HOPELESS: 0
1. LITTLE INTEREST OR PLEASURE IN DOING THINGS: 0
SUM OF ALL RESPONSES TO PHQ QUESTIONS 1-9: 0

## 2023-05-26 ASSESSMENT — ENCOUNTER SYMPTOMS
ABDOMINAL PAIN: 1
CHEST TIGHTNESS: 0
SINUS PRESSURE: 0
SHORTNESS OF BREATH: 0
COUGH: 0
SORE THROAT: 1
BACK PAIN: 0
DIARRHEA: 0
BLOOD IN STOOL: 0
SINUS PAIN: 0
CONSTIPATION: 0
VOMITING: 0
EYE REDNESS: 0
RHINORRHEA: 0
RESPIRATORY NEGATIVE: 1
NAUSEA: 0
EYE PAIN: 0
EYES NEGATIVE: 1

## 2023-05-26 NOTE — TELEPHONE ENCOUNTER
Incoming call from patient on the NT line c/o diarrhea that started wed, dull LLQ abdominal pain & a scratchy throat. She states the diarrhea episodes are improving but the dull pain is new. She states in the past when her kids had strep they also had some gi distress similar to what she is experiencing. She did do a covid test this morning & this was neg. She is thinking she may have strep & would like to be tested for this. Denies fevers. Pt agreed to appt with the NP for eval. Pt was thankful.

## 2023-05-26 NOTE — PROGRESS NOTES
Assessment and Plan     1. Strep throat: Will start antibiotic treatment. BRAT diet for GI symptoms recommended. Hydration and rest advised. Tylenol for pain as needed. Advised to return if symptoms continue and/or worsen after treatment. -     AMB POC RAPID STREP A  -     amoxicillin-clavulanate (AUGMENTIN) 875-125 MG per tablet; Take 1 tablet by mouth 2 times daily for 10 days, Disp-20 tablet, R-0Normal     Benefits, risks, possible drug interactions, and side effects of all new medications were reviewed with the patient. Pt verbalized understanding. An electronic signature was used to authenticate this note. Barry Mora, APRN - CNP  5/26/2023    Follow-up and Dispositions    Return if symptoms worsen or fail to improve. History of Present Illness   Chief Complaint     Shiela Fish is a 66 y.o. female here for had concerns including Pharyngitis (Sore throat; abdominal pain; started Wednesday; diarrhea). Pt presented today with sore throat, mild body aches and diarrhea, onset 3 days ago. Pt then started with left lower abdominal pain yesterday, pain is dull and mild. Denies cough, fever, chills, urinary symptoms. Pt admits bowel movements are improving. Pt tested negative for covid-19 at home. Pt denies sick contacts. Review of Systems  Review of Systems   Constitutional: Negative. Negative for chills, fatigue, fever and unexpected weight change. HENT:  Positive for postnasal drip and sore throat. Negative for congestion, ear discharge, ear pain, rhinorrhea, sinus pressure and sinus pain. Eyes: Negative. Negative for pain, redness and visual disturbance. Respiratory: Negative. Negative for cough, chest tightness and shortness of breath. Cardiovascular: Negative. Negative for chest pain and palpitations. Gastrointestinal:  Positive for abdominal pain. Negative for blood in stool, constipation, diarrhea, nausea and vomiting. Endocrine: Negative.   Negative for

## 2023-06-08 ENCOUNTER — TELEPHONE (OUTPATIENT)
Age: 63
End: 2023-06-08

## 2023-06-08 NOTE — TELEPHONE ENCOUNTER
----- Message from [facility] [employer] [id number] sent at 6/8/2023  9:55 AM EDT -----  Subject: Medication Problem    Medication: Other - Antibiotic needed  Dosage: As prescribed  Ordering Provider: cindy    Question/Problem: The patient has been seen 2 weeks ago for strep throat   with a double ear infection. She completed the previous antibiotics   prescribed and she is feeling worse than before. She is wanting to talk to   the office regarding her symptoms.        Pharmacy: [id number] - [place], [address]   [phone] [place] [phone]    ---------------------------------------------------------------------------  --------------  Asiya PEDROZA  [phone]; OK to leave message on voicemail  ---------------------------------------------------------------------------  --------------    SCRIPT ANSWERS  Relationship to Patient: Self

## 2023-06-08 NOTE — TELEPHONE ENCOUNTER
Pt is calling states she was just in here for strep throat. Pt states she has not gotten any better and wanted to know if the doctor could call her in something else or come back in to get tested again.  Please advise

## 2023-06-08 NOTE — TELEPHONE ENCOUNTER
Called patient and verified name and date of birth. Chief Complaint   Patient presents with    Pharyngitis     Dx of strep 5/26/23, sore throat and ear pain not improving. Pt complains of sore throat and both left and right ear pain getting worse after finishing treatment for Strep on 5/26/23. Pt has finished Abx therapy. Pt asked for next availible appt. Offered appt for tomorrow at [phone] with NP. Pt accepted. Call or return to clinic prn if these symptoms worsen or fail to improve as anticipated. No further questions at this time.      Sarah Brady, EDYTA  6/8/23  10:20 AM

## 2023-06-09 ENCOUNTER — OFFICE VISIT (OUTPATIENT)
Age: 63
End: 2023-06-09
Payer: COMMERCIAL

## 2023-06-09 VITALS
TEMPERATURE: 97.8 F | BODY MASS INDEX: 24.11 KG/M2 | OXYGEN SATURATION: 97 % | WEIGHT: 150 LBS | HEIGHT: 66 IN | SYSTOLIC BLOOD PRESSURE: 109 MMHG | RESPIRATION RATE: 16 BRPM | DIASTOLIC BLOOD PRESSURE: 77 MMHG | HEART RATE: 76 BPM

## 2023-06-09 DIAGNOSIS — J02.0 STREP PHARYNGITIS: Primary | ICD-10-CM

## 2023-06-09 LAB
GROUP A STREP ANTIGEN, POC: POSITIVE
VALID INTERNAL CONTROL, POC: YES

## 2023-06-09 PROCEDURE — 87880 STREP A ASSAY W/OPTIC: CPT | Performed by: NURSE PRACTITIONER

## 2023-06-09 PROCEDURE — 99213 OFFICE O/P EST LOW 20 MIN: CPT | Performed by: NURSE PRACTITIONER

## 2023-06-09 RX ORDER — CLINDAMYCIN HYDROCHLORIDE 300 MG/1
300 CAPSULE ORAL 3 TIMES DAILY
Qty: 30 CAPSULE | Refills: 0 | Status: SHIPPED | OUTPATIENT
Start: 2023-06-09 | End: 2023-06-19

## 2023-06-09 ASSESSMENT — ENCOUNTER SYMPTOMS
EYE PAIN: 0
EYES NEGATIVE: 1
BLOOD IN STOOL: 0
GASTROINTESTINAL NEGATIVE: 1
CONSTIPATION: 0
COUGH: 0
CHEST TIGHTNESS: 0
ABDOMINAL PAIN: 0
NAUSEA: 0
RHINORRHEA: 0
SORE THROAT: 1
EYE REDNESS: 0
RESPIRATORY NEGATIVE: 1
BACK PAIN: 0
SINUS PAIN: 0
VOMITING: 0
SINUS PRESSURE: 0
SHORTNESS OF BREATH: 0
DIARRHEA: 0

## 2023-06-09 ASSESSMENT — PATIENT HEALTH QUESTIONNAIRE - PHQ9
2. FEELING DOWN, DEPRESSED OR HOPELESS: 0
SUM OF ALL RESPONSES TO PHQ9 QUESTIONS 1 & 2: 0
SUM OF ALL RESPONSES TO PHQ QUESTIONS 1-9: 0
1. LITTLE INTEREST OR PLEASURE IN DOING THINGS: 0
SUM OF ALL RESPONSES TO PHQ QUESTIONS 1-9: 0

## 2023-06-09 NOTE — PROGRESS NOTES
Assessment and Plan     1. Strep pharyngitis: Strep A test positive. Will start Clindamycin treatment, mode of use and possible side effects discussed. Tylenol and ibuprofen for pain as needed. Hydration recommended. Return instructions given. Pt verbalized understanding.   -     AMB POC RAPID STREP A  -     clindamycin (CLEOCIN) 300 MG capsule; Take 1 capsule by mouth 3 times daily for 10 days, Disp-30 capsule, R-0Normal       Benefits, risks, possible drug interactions, and side effects of all new medications were reviewed with the patient. Pt verbalized understanding. Tests Preformed During Visit:    Results for orders placed or performed in visit on 06/09/23   AMB POC RAPID STREP A   Result Value Ref Range    Valid Internal Control, POC yes     Group A Strep Antigen, POC Positive Negative      An electronic signature was used to authenticate this note. Jonan Montilla, VINNY - CNP  6/9/2023    Follow-up and Dispositions    Return if symptoms worsen or fail to improve. History of Present Illness   Chief Complaint     Yuli Pérez is a 66 y.o. female here for had concerns including Pharyngitis (Sore throat; ear pressure). Pt presented today for follow-up on strep pharyngitis. Pt admits she completed Augmentin treatment with compliance. Pt admits she continues to have sore throat, ear pressure. Pt concerns she continues to have strep as Augmentin is often a mild antibiotic for her. Denies fever, chills, fatigue    Review of Systems  Review of Systems   Constitutional: Negative. Negative for chills, fatigue, fever and unexpected weight change. HENT:  Positive for ear pain (bilateral ear pressure) and sore throat. Negative for congestion, ear discharge, postnasal drip, rhinorrhea, sinus pressure, sinus pain and tinnitus. Eyes: Negative. Negative for pain, redness and visual disturbance. Respiratory: Negative. Negative for cough, chest tightness and shortness of breath.

## 2023-09-12 SDOH — ECONOMIC STABILITY: FOOD INSECURITY: WITHIN THE PAST 12 MONTHS, THE FOOD YOU BOUGHT JUST DIDN'T LAST AND YOU DIDN'T HAVE MONEY TO GET MORE.: NEVER TRUE

## 2023-09-12 SDOH — ECONOMIC STABILITY: FOOD INSECURITY: WITHIN THE PAST 12 MONTHS, YOU WORRIED THAT YOUR FOOD WOULD RUN OUT BEFORE YOU GOT MONEY TO BUY MORE.: NEVER TRUE

## 2023-09-12 SDOH — ECONOMIC STABILITY: HOUSING INSECURITY
IN THE LAST 12 MONTHS, WAS THERE A TIME WHEN YOU DID NOT HAVE A STEADY PLACE TO SLEEP OR SLEPT IN A SHELTER (INCLUDING NOW)?: NO

## 2023-09-12 SDOH — ECONOMIC STABILITY: TRANSPORTATION INSECURITY
IN THE PAST 12 MONTHS, HAS LACK OF TRANSPORTATION KEPT YOU FROM MEETINGS, WORK, OR FROM GETTING THINGS NEEDED FOR DAILY LIVING?: NO

## 2023-09-12 SDOH — ECONOMIC STABILITY: INCOME INSECURITY: HOW HARD IS IT FOR YOU TO PAY FOR THE VERY BASICS LIKE FOOD, HOUSING, MEDICAL CARE, AND HEATING?: NOT HARD AT ALL

## 2023-09-15 ENCOUNTER — OFFICE VISIT (OUTPATIENT)
Age: 63
End: 2023-09-15
Payer: COMMERCIAL

## 2023-09-15 VITALS
HEIGHT: 66 IN | RESPIRATION RATE: 16 BRPM | OXYGEN SATURATION: 97 % | WEIGHT: 156 LBS | HEART RATE: 82 BPM | BODY MASS INDEX: 25.07 KG/M2 | SYSTOLIC BLOOD PRESSURE: 108 MMHG | TEMPERATURE: 97.5 F | DIASTOLIC BLOOD PRESSURE: 73 MMHG

## 2023-09-15 DIAGNOSIS — H00.025 HORDEOLUM INTERNUM OF LEFT LOWER EYELID: Primary | ICD-10-CM

## 2023-09-15 DIAGNOSIS — H57.89 EYE SWELLING, LEFT: ICD-10-CM

## 2023-09-15 DIAGNOSIS — L30.9 ECZEMA, UNSPECIFIED TYPE: ICD-10-CM

## 2023-09-15 PROCEDURE — 99213 OFFICE O/P EST LOW 20 MIN: CPT | Performed by: INTERNAL MEDICINE

## 2023-09-15 RX ORDER — MOMETASONE FUROATE 1 MG/G
CREAM TOPICAL
Qty: 15 G | Refills: 1 | Status: SHIPPED | OUTPATIENT
Start: 2023-09-15

## 2023-09-15 RX ORDER — DOXYCYCLINE HYCLATE 100 MG
100 TABLET ORAL 2 TIMES DAILY
Qty: 14 TABLET | Refills: 0 | Status: SHIPPED | OUTPATIENT
Start: 2023-09-15 | End: 2023-09-22

## 2023-09-15 NOTE — PROGRESS NOTES
General: No focal deficit present. Mental Status: She is alert and oriented to person, place, and time. Psychiatric:         Behavior: Behavior normal.                  An electronic signature was used to authenticate this note.     --Sara West MD

## 2023-11-09 ENCOUNTER — OFFICE VISIT (OUTPATIENT)
Age: 63
End: 2023-11-09

## 2023-11-09 VITALS
OXYGEN SATURATION: 95 % | RESPIRATION RATE: 20 BRPM | BODY MASS INDEX: 24.92 KG/M2 | DIASTOLIC BLOOD PRESSURE: 78 MMHG | WEIGHT: 154.4 LBS | SYSTOLIC BLOOD PRESSURE: 122 MMHG | TEMPERATURE: 98.2 F | HEART RATE: 77 BPM

## 2023-11-09 DIAGNOSIS — H69.92 DYSFUNCTION OF LEFT EUSTACHIAN TUBE: ICD-10-CM

## 2023-11-09 DIAGNOSIS — R09.89 UPPER RESPIRATORY SYMPTOM: ICD-10-CM

## 2023-11-09 DIAGNOSIS — J06.9 VIRAL URI: Primary | ICD-10-CM

## 2023-11-09 LAB
EXP DATE SOLUTION: NORMAL
EXP DATE SWAB: NORMAL
EXPIRATION DATE: NORMAL
INFLUENZA A ANTIGEN, POC: NEGATIVE
INFLUENZA B ANTIGEN, POC: NEGATIVE
LOT NUMBER POC: NORMAL
LOT NUMBER SOLUTION: NORMAL
LOT NUMBER SWAB: NORMAL
SARS-COV-2 RNA, POC: NEGATIVE
VALID INTERNAL CONTROL, POC: NORMAL

## 2023-11-09 RX ORDER — OXYMETAZOLINE HYDROCHLORIDE 0.05 G/100ML
2 SPRAY NASAL 2 TIMES DAILY
Qty: 2 ML | Refills: 0 | Status: SHIPPED | OUTPATIENT
Start: 2023-11-09 | End: 2023-11-12

## 2023-11-09 NOTE — PATIENT INSTRUCTIONS
- Consider using OTC nasacort daily and a non drowsy allergy medication such as allegra,  zyrtec, claritin or xyzal, small dose of benadryl at night for the next week. - Continue using a combination cough and decongestant medicine such as Mucinex D OR Sudafed. (Push plenty of fluids with these medication as it will dry you up). - Consider using OTC ibuprofen 600- 800 mg every 6-8 hours for pain and discomfort on a scheduled basis for the next 3-4. Take with food. Alternate with Acetaminophen (Tylenol): 500mg 1-2 tablets every 6 hours as needed for pain.     -  Follow-up in 5 days if symptoms worsen or persist.

## 2023-11-17 ENCOUNTER — TELEMEDICINE (OUTPATIENT)
Age: 63
End: 2023-11-17
Payer: COMMERCIAL

## 2023-11-17 ENCOUNTER — TELEPHONE (OUTPATIENT)
Age: 63
End: 2023-11-17

## 2023-11-17 DIAGNOSIS — J01.90 SUBACUTE SINUSITIS, UNSPECIFIED LOCATION: Primary | ICD-10-CM

## 2023-11-17 PROCEDURE — 99213 OFFICE O/P EST LOW 20 MIN: CPT | Performed by: INTERNAL MEDICINE

## 2023-11-17 RX ORDER — AMOXICILLIN AND CLAVULANATE POTASSIUM 875; 125 MG/1; MG/1
1 TABLET, FILM COATED ORAL 2 TIMES DAILY
Qty: 20 TABLET | Refills: 0 | Status: SHIPPED | OUTPATIENT
Start: 2023-11-17 | End: 2023-11-27

## 2023-11-17 NOTE — PROGRESS NOTES
Odin Jones, was evaluated through a synchronous (real-time) audio-video encounter. The patient (or guardian if applicable) is aware that this is a billable service, which includes applicable co-pays. This Virtual Visit was conducted with patient's (and/or legal guardian's) consent. Patient identification was verified, and a caregiver was present when appropriate. The patient was located at Home: [address]  Provider was located at Other: office    Established patient  Odin Jones (:  1960) is a , presenting virtually for evaluation of the following:    Assessment & Plan   Below is the assessment and plan developed based on review of pertinent history, physical exam, labs, studies, and medications. 1. Subacute sinusitis, unspecified location-suspect secondary sinusitis after recent viral uri  Tolerates augmentin well  Use it for 7 days and extend to 10 days if not fully improved  Use sudafed in am and cont allegra and mucinex d in pm  Follow up if signs and symptoms worsen or change. After hours number given. -     amoxicillin-clavulanate (AUGMENTIN) 875-125 MG per tablet; Take 1 tablet by mouth 2 times daily for 10 days, Disp-20 tablet, R-0Normal    No follow-ups on file. Subjective   HPI  1 week ago had urgent care visit for uri with neg covid and flu swabs. Has been using mucinex d and allegra and nasal spray but feels she is moving in the wrong direction now with ear fullness and postnasal drainage and discomfort in back of throat and head pressure. Feels like prior sinusitis. No sob and  no fever.  Driving to [place] in am.    Review of Systems       Objective   Patient-Reported Vitals  Patient-Reported Weight: 154lb       Physical Exam  [INSTRUCTIONS:  \"[x]\" Indicates a positive item  \"[]\" Indicates a negative item  -- DELETE ALL ITEMS NOT EXAMINED]    Constitutional: [x] Appears well-developed and well-nourished [x] No apparent distress      []

## 2023-11-17 NOTE — TELEPHONE ENCOUNTER
Patient was calling to say she went to urgent care last week for upper respiratory symptoms - states this is not getting any better and is going out of town tomorrow, wondering if we can call in more medications  She does not want this to turn into a sinus infection

## 2023-11-17 NOTE — TELEPHONE ENCOUNTER
Please notify Jeffrey Bolaños is only available for virtual appts today so she has been scheduled virtually today at 10:15. She will need to log into her mychart with her mobile phone & under visits click on the camera icon to gain access to the appt.

## 2023-11-17 NOTE — TELEPHONE ENCOUNTER
Patient is calling states she was just speaking with [place] regarding her VV appt at 10:15am. Patient wanted to let [place] know she got her flu and covid vaccine on October 6th.